# Patient Record
Sex: MALE | Race: WHITE | Employment: FULL TIME | URBAN - METROPOLITAN AREA
[De-identification: names, ages, dates, MRNs, and addresses within clinical notes are randomized per-mention and may not be internally consistent; named-entity substitution may affect disease eponyms.]

---

## 2019-12-10 ENCOUNTER — HOSPITAL ENCOUNTER (OUTPATIENT)
Dept: GENERAL RADIOLOGY | Age: 49
Discharge: HOME | End: 2019-12-12
Payer: COMMERCIAL

## 2019-12-10 ENCOUNTER — HOSPITAL ENCOUNTER (OUTPATIENT)
Age: 49
Discharge: HOME | End: 2019-12-10
Payer: COMMERCIAL

## 2019-12-10 DIAGNOSIS — R52 PAIN: ICD-10-CM

## 2019-12-10 PROCEDURE — 73110 X-RAY EXAM OF WRIST: CPT

## 2019-12-10 PROCEDURE — 73090 X-RAY EXAM OF FOREARM: CPT

## 2021-07-08 ENCOUNTER — APPOINTMENT (OUTPATIENT)
Dept: GENERAL RADIOLOGY | Age: 51
DRG: 303 | End: 2021-07-08
Payer: COMMERCIAL

## 2021-07-08 ENCOUNTER — HOSPITAL ENCOUNTER (INPATIENT)
Age: 51
LOS: 1 days | Discharge: HOME | DRG: 303 | End: 2021-07-09
Attending: EMERGENCY MEDICINE | Admitting: EMERGENCY MEDICINE
Payer: COMMERCIAL

## 2021-07-08 DIAGNOSIS — I50.9 ACUTE ON CHRONIC CONGESTIVE HEART FAILURE, UNSPECIFIED HEART FAILURE TYPE (HCC): Primary | ICD-10-CM

## 2021-07-08 PROBLEM — R07.9 CHEST PAIN: Status: ACTIVE | Noted: 2021-07-08

## 2021-07-08 LAB
ABSOLUTE EOS #: 0.17 K/UL (ref 0–0.44)
ABSOLUTE IMMATURE GRANULOCYTE: 0.13 K/UL (ref 0–0.3)
ABSOLUTE LYMPH #: 2.26 K/UL (ref 1.1–3.7)
ABSOLUTE MONO #: 0.77 K/UL (ref 0.1–1.2)
ANION GAP SERPL CALCULATED.3IONS-SCNC: 16 MMOL/L (ref 9–17)
BASOPHILS # BLD: 1 % (ref 0–2)
BASOPHILS ABSOLUTE: 0.09 K/UL (ref 0–0.2)
BNP INTERPRETATION: NORMAL
BUN BLDV-MCNC: 11 MG/DL (ref 6–20)
BUN/CREAT BLD: ABNORMAL (ref 9–20)
CALCIUM SERPL-MCNC: 9.3 MG/DL (ref 8.6–10.4)
CHLORIDE BLD-SCNC: 92 MMOL/L (ref 98–107)
CO2: 24 MMOL/L (ref 20–31)
CREAT SERPL-MCNC: 1.13 MG/DL (ref 0.7–1.2)
D-DIMER QUANTITATIVE: 0.41 MG/L FEU
DIFFERENTIAL TYPE: ABNORMAL
EOSINOPHILS RELATIVE PERCENT: 2 % (ref 1–4)
GFR AFRICAN AMERICAN: >60 ML/MIN
GFR NON-AFRICAN AMERICAN: >60 ML/MIN
GFR SERPL CREATININE-BSD FRML MDRD: ABNORMAL ML/MIN/{1.73_M2}
GFR SERPL CREATININE-BSD FRML MDRD: ABNORMAL ML/MIN/{1.73_M2}
GLUCOSE BLD-MCNC: 370 MG/DL (ref 70–99)
HCT VFR BLD CALC: 46.5 % (ref 40.7–50.3)
HEMOGLOBIN: 14.9 G/DL (ref 13–17)
IMMATURE GRANULOCYTES: 1 %
LIPASE: 47 U/L (ref 13–60)
LYMPHOCYTES # BLD: 25 % (ref 24–43)
MCH RBC QN AUTO: 30.6 PG (ref 25.2–33.5)
MCHC RBC AUTO-ENTMCNC: 32 G/DL (ref 28.4–34.8)
MCV RBC AUTO: 95.5 FL (ref 82.6–102.9)
MONOCYTES # BLD: 9 % (ref 3–12)
NRBC AUTOMATED: 0 PER 100 WBC
PDW BLD-RTO: 14.1 % (ref 11.8–14.4)
PLATELET # BLD: 251 K/UL (ref 138–453)
PLATELET ESTIMATE: ABNORMAL
PMV BLD AUTO: 11 FL (ref 8.1–13.5)
POTASSIUM SERPL-SCNC: 4 MMOL/L (ref 3.7–5.3)
PRO-BNP: <20 PG/ML
RBC # BLD: 4.87 M/UL (ref 4.21–5.77)
RBC # BLD: ABNORMAL 10*6/UL
SEG NEUTROPHILS: 62 % (ref 36–65)
SEGMENTED NEUTROPHILS ABSOLUTE COUNT: 5.61 K/UL (ref 1.5–8.1)
SODIUM BLD-SCNC: 132 MMOL/L (ref 135–144)
TROPONIN INTERP: NORMAL
TROPONIN INTERP: NORMAL
TROPONIN T: NORMAL NG/ML
TROPONIN T: NORMAL NG/ML
TROPONIN, HIGH SENSITIVITY: 17 NG/L (ref 0–22)
TROPONIN, HIGH SENSITIVITY: 18 NG/L (ref 0–22)
WBC # BLD: 9 K/UL (ref 3.5–11.3)
WBC # BLD: ABNORMAL 10*3/UL

## 2021-07-08 PROCEDURE — G0378 HOSPITAL OBSERVATION PER HR: HCPCS

## 2021-07-08 PROCEDURE — 93005 ELECTROCARDIOGRAM TRACING: CPT | Performed by: STUDENT IN AN ORGANIZED HEALTH CARE EDUCATION/TRAINING PROGRAM

## 2021-07-08 PROCEDURE — 80048 BASIC METABOLIC PNL TOTAL CA: CPT

## 2021-07-08 PROCEDURE — 2580000003 HC RX 258: Performed by: STUDENT IN AN ORGANIZED HEALTH CARE EDUCATION/TRAINING PROGRAM

## 2021-07-08 PROCEDURE — 71046 X-RAY EXAM CHEST 2 VIEWS: CPT

## 2021-07-08 PROCEDURE — 84484 ASSAY OF TROPONIN QUANT: CPT

## 2021-07-08 PROCEDURE — 85025 COMPLETE CBC W/AUTO DIFF WBC: CPT

## 2021-07-08 PROCEDURE — 83690 ASSAY OF LIPASE: CPT

## 2021-07-08 PROCEDURE — 1200000000 HC SEMI PRIVATE

## 2021-07-08 PROCEDURE — 85379 FIBRIN DEGRADATION QUANT: CPT

## 2021-07-08 PROCEDURE — 83880 ASSAY OF NATRIURETIC PEPTIDE: CPT

## 2021-07-08 PROCEDURE — 99283 EMERGENCY DEPT VISIT LOW MDM: CPT

## 2021-07-08 RX ORDER — 0.9 % SODIUM CHLORIDE 0.9 %
1000 INTRAVENOUS SOLUTION INTRAVENOUS ONCE
Status: COMPLETED | OUTPATIENT
Start: 2021-07-08 | End: 2021-07-08

## 2021-07-08 RX ORDER — PRASUGREL 10 MG/1
10 TABLET, FILM COATED ORAL DAILY
Status: DISCONTINUED | OUTPATIENT
Start: 2021-07-09 | End: 2021-07-09 | Stop reason: HOSPADM

## 2021-07-08 RX ORDER — DULOXETIN HYDROCHLORIDE 30 MG/1
60 CAPSULE, DELAYED RELEASE ORAL DAILY
Status: DISCONTINUED | OUTPATIENT
Start: 2021-07-09 | End: 2021-07-09 | Stop reason: HOSPADM

## 2021-07-08 RX ORDER — AMLODIPINE BESYLATE 2.5 MG/1
2.5 TABLET ORAL NIGHTLY
Status: DISCONTINUED | OUTPATIENT
Start: 2021-07-08 | End: 2021-07-09 | Stop reason: HOSPADM

## 2021-07-08 RX ORDER — CETIRIZINE HYDROCHLORIDE 10 MG/1
10 TABLET ORAL DAILY
Status: DISCONTINUED | OUTPATIENT
Start: 2021-07-08 | End: 2021-07-09 | Stop reason: HOSPADM

## 2021-07-08 RX ORDER — CHOLECALCIFEROL (VITAMIN D3) 125 MCG
1000 CAPSULE ORAL DAILY
Status: DISCONTINUED | OUTPATIENT
Start: 2021-07-09 | End: 2021-07-09 | Stop reason: HOSPADM

## 2021-07-08 RX ORDER — SULFAMETHOXAZOLE AND TRIMETHOPRIM 800; 160 MG/1; MG/1
1 TABLET ORAL 2 TIMES DAILY
Status: DISCONTINUED | OUTPATIENT
Start: 2021-07-08 | End: 2021-07-09 | Stop reason: HOSPADM

## 2021-07-08 RX ORDER — ASPIRIN 81 MG/1
81 TABLET ORAL DAILY
Status: DISCONTINUED | OUTPATIENT
Start: 2021-07-09 | End: 2021-07-09 | Stop reason: HOSPADM

## 2021-07-08 RX ORDER — ONDANSETRON 2 MG/ML
4 INJECTION INTRAMUSCULAR; INTRAVENOUS EVERY 6 HOURS PRN
Status: DISCONTINUED | OUTPATIENT
Start: 2021-07-08 | End: 2021-07-09 | Stop reason: HOSPADM

## 2021-07-08 RX ORDER — FAMOTIDINE 20 MG/1
20 TABLET, FILM COATED ORAL 2 TIMES DAILY
COMMUNITY

## 2021-07-08 RX ORDER — EMPAGLIFLOZIN 25 MG/1
25 TABLET, FILM COATED ORAL DAILY
COMMUNITY

## 2021-07-08 RX ORDER — SODIUM CHLORIDE 9 MG/ML
25 INJECTION, SOLUTION INTRAVENOUS PRN
Status: DISCONTINUED | OUTPATIENT
Start: 2021-07-08 | End: 2021-07-09 | Stop reason: HOSPADM

## 2021-07-08 RX ORDER — ASPIRIN 81 MG/1
81 TABLET ORAL DAILY
COMMUNITY

## 2021-07-08 RX ORDER — ACITRETIN 25 MG/1
25 CAPSULE ORAL 2 TIMES DAILY
COMMUNITY

## 2021-07-08 RX ORDER — TRAZODONE HYDROCHLORIDE 100 MG/1
100 TABLET ORAL NIGHTLY
Status: DISCONTINUED | OUTPATIENT
Start: 2021-07-08 | End: 2021-07-08

## 2021-07-08 RX ORDER — POTASSIUM CHLORIDE 750 MG/1
10 TABLET, FILM COATED, EXTENDED RELEASE ORAL NIGHTLY
COMMUNITY

## 2021-07-08 RX ORDER — SODIUM CHLORIDE, SODIUM LACTATE, POTASSIUM CHLORIDE, CALCIUM CHLORIDE 600; 310; 30; 20 MG/100ML; MG/100ML; MG/100ML; MG/100ML
1000 INJECTION, SOLUTION INTRAVENOUS ONCE
Status: COMPLETED | OUTPATIENT
Start: 2021-07-08 | End: 2021-07-08

## 2021-07-08 RX ORDER — DAPSONE 25 MG/1
25 TABLET ORAL 2 TIMES DAILY
COMMUNITY

## 2021-07-08 RX ORDER — RANOLAZINE 500 MG/1
500 TABLET, EXTENDED RELEASE ORAL 2 TIMES DAILY
Status: DISCONTINUED | OUTPATIENT
Start: 2021-07-08 | End: 2021-07-09

## 2021-07-08 RX ORDER — BUMETANIDE 2 MG/1
2 TABLET ORAL 2 TIMES DAILY
COMMUNITY

## 2021-07-08 RX ORDER — VENLAFAXINE HYDROCHLORIDE 75 MG/1
75 CAPSULE, EXTENDED RELEASE ORAL DAILY
Status: DISCONTINUED | OUTPATIENT
Start: 2021-07-08 | End: 2021-07-08

## 2021-07-08 RX ORDER — GABAPENTIN 600 MG/1
600 TABLET ORAL NIGHTLY
Status: DISCONTINUED | OUTPATIENT
Start: 2021-07-08 | End: 2021-07-09 | Stop reason: HOSPADM

## 2021-07-08 RX ORDER — LISINOPRIL 10 MG/1
5 TABLET ORAL DAILY
Status: DISCONTINUED | OUTPATIENT
Start: 2021-07-08 | End: 2021-07-09 | Stop reason: HOSPADM

## 2021-07-08 RX ORDER — DULOXETIN HYDROCHLORIDE 60 MG/1
60 CAPSULE, DELAYED RELEASE ORAL DAILY
COMMUNITY

## 2021-07-08 RX ORDER — DAPSONE 25 MG/1
25 TABLET ORAL 2 TIMES DAILY
Status: DISCONTINUED | OUTPATIENT
Start: 2021-07-08 | End: 2021-07-09 | Stop reason: HOSPADM

## 2021-07-08 RX ORDER — MELOXICAM 7.5 MG/1
15 TABLET ORAL DAILY
Status: DISCONTINUED | OUTPATIENT
Start: 2021-07-08 | End: 2021-07-09

## 2021-07-08 RX ORDER — ONDANSETRON 4 MG/1
4 TABLET, ORALLY DISINTEGRATING ORAL EVERY 8 HOURS PRN
Status: DISCONTINUED | OUTPATIENT
Start: 2021-07-08 | End: 2021-07-09 | Stop reason: HOSPADM

## 2021-07-08 RX ORDER — ATORVASTATIN CALCIUM 20 MG/1
20 TABLET, FILM COATED ORAL DAILY
Status: DISCONTINUED | OUTPATIENT
Start: 2021-07-08 | End: 2021-07-08

## 2021-07-08 RX ORDER — AMLODIPINE BESYLATE 2.5 MG/1
2.5 TABLET ORAL NIGHTLY
COMMUNITY

## 2021-07-08 RX ORDER — CITALOPRAM 20 MG/1
40 TABLET ORAL DAILY
Status: DISCONTINUED | OUTPATIENT
Start: 2021-07-08 | End: 2021-07-08

## 2021-07-08 RX ORDER — PANTOPRAZOLE SODIUM 40 MG/1
40 TABLET, DELAYED RELEASE ORAL DAILY
Status: DISCONTINUED | OUTPATIENT
Start: 2021-07-08 | End: 2021-07-09 | Stop reason: HOSPADM

## 2021-07-08 RX ORDER — GABAPENTIN 300 MG/1
600 CAPSULE ORAL NIGHTLY
COMMUNITY

## 2021-07-08 RX ORDER — LANOLIN ALCOHOL/MO/W.PET/CERES
1000 CREAM (GRAM) TOPICAL DAILY
COMMUNITY

## 2021-07-08 RX ORDER — SULFAMETHOXAZOLE AND TRIMETHOPRIM 800; 160 MG/1; MG/1
1 TABLET ORAL 2 TIMES DAILY
COMMUNITY

## 2021-07-08 RX ORDER — POTASSIUM CHLORIDE 750 MG/1
10 CAPSULE, EXTENDED RELEASE ORAL NIGHTLY
Status: DISCONTINUED | OUTPATIENT
Start: 2021-07-08 | End: 2021-07-09 | Stop reason: HOSPADM

## 2021-07-08 RX ORDER — SODIUM CHLORIDE 0.9 % (FLUSH) 0.9 %
5-40 SYRINGE (ML) INJECTION PRN
Status: DISCONTINUED | OUTPATIENT
Start: 2021-07-08 | End: 2021-07-09 | Stop reason: HOSPADM

## 2021-07-08 RX ORDER — LISINOPRIL 20 MG/1
20 TABLET ORAL DAILY
COMMUNITY

## 2021-07-08 RX ORDER — GLIPIZIDE 5 MG/1
2.5 TABLET ORAL
Status: DISCONTINUED | OUTPATIENT
Start: 2021-07-09 | End: 2021-07-09 | Stop reason: HOSPADM

## 2021-07-08 RX ORDER — ACETAMINOPHEN 325 MG/1
650 TABLET ORAL EVERY 4 HOURS PRN
Status: DISCONTINUED | OUTPATIENT
Start: 2021-07-08 | End: 2021-07-09 | Stop reason: HOSPADM

## 2021-07-08 RX ORDER — ACITRETIN 25 MG/1
25 CAPSULE ORAL 2 TIMES DAILY
Status: DISCONTINUED | OUTPATIENT
Start: 2021-07-08 | End: 2021-07-09 | Stop reason: HOSPADM

## 2021-07-08 RX ORDER — SODIUM CHLORIDE 0.9 % (FLUSH) 0.9 %
5-40 SYRINGE (ML) INJECTION EVERY 12 HOURS SCHEDULED
Status: DISCONTINUED | OUTPATIENT
Start: 2021-07-08 | End: 2021-07-09 | Stop reason: HOSPADM

## 2021-07-08 RX ORDER — CARVEDILOL 12.5 MG/1
12.5 TABLET ORAL 2 TIMES DAILY
Status: DISCONTINUED | OUTPATIENT
Start: 2021-07-08 | End: 2021-07-09 | Stop reason: HOSPADM

## 2021-07-08 RX ORDER — BUMETANIDE 1 MG/1
2 TABLET ORAL DAILY
Status: DISCONTINUED | OUTPATIENT
Start: 2021-07-08 | End: 2021-07-09 | Stop reason: HOSPADM

## 2021-07-08 RX ORDER — RANOLAZINE 500 MG/1
500 TABLET, EXTENDED RELEASE ORAL 2 TIMES DAILY
Status: ON HOLD | COMMUNITY
End: 2021-07-09 | Stop reason: HOSPADM

## 2021-07-08 RX ORDER — PRASUGREL 10 MG/1
10 TABLET, FILM COATED ORAL DAILY
COMMUNITY

## 2021-07-08 RX ORDER — CARVEDILOL 12.5 MG/1
12.5 TABLET ORAL 2 TIMES DAILY WITH MEALS
COMMUNITY

## 2021-07-08 RX ORDER — CHOLECALCIFEROL (VITAMIN D3) 1250 MCG
CAPSULE ORAL
COMMUNITY

## 2021-07-08 RX ADMIN — SODIUM CHLORIDE, POTASSIUM CHLORIDE, SODIUM LACTATE AND CALCIUM CHLORIDE 1000 ML: 600; 310; 30; 20 INJECTION, SOLUTION INTRAVENOUS at 21:59

## 2021-07-08 RX ADMIN — SODIUM CHLORIDE 1000 ML: 9 INJECTION, SOLUTION INTRAVENOUS at 18:13

## 2021-07-08 ASSESSMENT — ENCOUNTER SYMPTOMS
TROUBLE SWALLOWING: 0
ABDOMINAL DISTENTION: 0
CHEST TIGHTNESS: 1
WHEEZING: 0
COUGH: 0
APNEA: 0
VOMITING: 0
CHOKING: 0
BACK PAIN: 0
ABDOMINAL PAIN: 0
NAUSEA: 0
STRIDOR: 0
SHORTNESS OF BREATH: 1
BLOOD IN STOOL: 0

## 2021-07-08 ASSESSMENT — PAIN SCALES - GENERAL: PAINLEVEL_OUTOF10: 3

## 2021-07-08 ASSESSMENT — HEART SCORE: ECG: 1

## 2021-07-08 NOTE — LETTER
Taniya Case 3[facility] Med Surg  [address] [facility] [address]  Phone: [phone]         July 9, 2021     Patient: Alba Del Valle   YOB: 1970   Date of Visit: 7/8/2021       To Whom It May Concern:    Sebastian Guzman was seen and treated in our emergency department on 7/8/2021. The following work duties are recommended.     He may return to work on Monday 7/12        *If the company does not have an occupational health provider, follow up should be at  [address] [phone]    In 1 week            Sincerely,        Liliam Gonzalez MD        Signature:__________________________________

## 2021-07-08 NOTE — ED TRIAGE NOTES
Pt reports to the ED via EMS with c/o chest pain. Pt states chest pain while at work and took x2 of his own nitro and called ems, they gave x2 nitro, 325 of Asprin and 25mcg of fentanyl. Pt has hx of x4 stents. Pt is A&Ox4, RR even and non labored.

## 2021-07-08 NOTE — ED PROVIDER NOTES
[address] [facility]  Emergency Department Encounter  EmergencyMedicine Resident     Pt Name:Arben Martin  MRN: [id number]  [place] 1970  Date of evaluation: 7/8/21  PCP:  Adriana Taylor    This patient was evaluated in the Emergency Department for symptoms described in the history of present illness. The patient was evaluated in the context of the global COVID-19 pandemic, which necessitated consideration that the patient might be at risk for infection with the SARS-CoV-2 virus that causes COVID-19. Institutional protocols and algorithms that pertain to the evaluation of patients at risk for COVID-19 are in a state of rapid change based on information released by regulatory bodies including the CDC and federal and state organizations. These policies and algorithms were followed during the patient's care in the ED. CHIEF COMPLAINT       Chief Complaint   Patient presents with    Chest Pain     Pt states chest pain while at work. pain rated 6/10       HISTORY OF PRESENT ILLNESS  (Location/Symptom, Timing/Onset, Context/Setting, Quality, Duration, Modifying Factors, Severity.)      Judith Morrison is a 48 y.o. male with a history of CHF multiple stents and diabetes presenting today with chest pain that started around noon rated currently a 3 out of 10 radiating to his axilla. Chest pain is associated with dyspnea, patient was walking around his cubicle when he felt a sudden onset. He was brought in by EMS, given 2 doses of nitroglycerin along with an aspirin. Patient does not have any dizziness drowsiness he did not lose consciousness there was no evidence of a syncopal episode. He did not feel palpitations, he does not have abdominal pain, and is otherwise doing well.       PAST MEDICAL / SURGICAL / SOCIAL / FAMILY HISTORY      has a past medical history of Chronic pain, Depression, Diabetes mellitus ([address] Edema, GERD (gastroesophageal reflux disease), Hyperlipidemia, Hypertension, Kidney calculi, and Vertigo. has a past surgical history that includes Tonsillectomy; Knee arthroscopy (Right); Spinal fusion; Knee arthroscopy (Left); Nerve Surgery; and tumor removal.      Social History     Socioeconomic History    Marital status:      Spouse name: Not on file    Number of children: Not on file    Years of education: Not on file    Highest education level: Not on file   Occupational History    Not on file   Tobacco Use    Smoking status: Never Smoker   Substance and Sexual Activity    Alcohol use: Yes     Comment: Socially    Drug use: No    Sexual activity: Not on file   Other Topics Concern    Not on file   Social History Narrative    Not on file     Social Determinants of Health     Financial Resource Strain:     Difficulty of Paying Living Expenses:    Food Insecurity:     Worried About Running Out of Food in the Last Year:     [address] in the Last Year:    Transportation Needs:     Lack of Transportation (Medical):  Lack of Transportation (Non-Medical):    Physical Activity:     Days of Exercise per Week:     Minutes of Exercise per Session:    Stress:     Feeling of Stress :    Social Connections:     Frequency of Communication with Friends and Family:     Frequency of Social Gatherings with Friends and Family:     Attends [employer] Services:     Active Member of Clubs or Organizations:     Attends Club or Organization Meetings:     Marital Status:    Intimate Partner Violence:     Fear of Current or Ex-Partner:     Emotionally Abused:     Physically Abused:     Sexually Abused:        History reviewed. No pertinent family history. Allergies:  Ceclor [cefaclor] and Pcn [penicillins]    Home Medications:  Prior to Admission medications    Medication Sig Start Date End Date Taking?  Authorizing Provider   acitretin (SORIATANE) 25 MG capsule Take 25 mg by mouth 2 times daily   Yes Historical Provider, MD   sulfamethoxazole-trimethoprim (BACTRIM DS) 800-160 MG per tablet Take 1 tablet by mouth 2 times daily   Yes Historical Provider, MD   bumetanide (BUMEX) 2 MG tablet Take 2 mg by mouth 2 times daily   Yes Historical Provider, MD   carvedilol (COREG) 12.5 MG tablet Take 12.5 mg by mouth 2 times daily (with meals)   Yes Historical Provider, MD   DULoxetine (CYMBALTA) 60 MG extended release capsule Take 60 mg by mouth daily   Yes Historical Provider, MD   dapsone 25 MG tablet Take 25 mg by mouth 2 times daily   Yes Historical Provider, MD   empagliflozin (JARDIANCE) 25 MG tablet Take 25 mg by mouth daily   Yes Historical Provider, MD   gabapentin (NEURONTIN) 300 MG capsule Take 600 mg by mouth nightly. Yes Historical Provider, MD   lisinopril (PRINIVIL;ZESTRIL) 20 MG tablet Take 20 mg by mouth daily   Yes Historical Provider, MD   metFORMIN (GLUCOPHAGE) 1000 MG tablet Take 1,000 mg by mouth 2 times daily (with meals)   Yes Historical Provider, MD   famotidine (PEPCID) 20 MG tablet Take 20 mg by mouth 2 times daily   Yes Historical Provider, MD   prasugrel (EFFIENT) 10 MG TABS Take 10 mg by mouth daily   Yes Historical Provider, MD   ranolazine (RANEXA) 500 MG extended release tablet Take 500 mg by mouth 2 times daily   Yes Historical Provider, MD   potassium chloride (KLOR-CON) 10 MEQ extended release tablet Take 10 mEq by mouth nightly   Yes Historical Provider, MD   Cholecalciferol (VITAMIN D3) 1.25 MG (23816 UT) CAPS Take by mouth 2x week   Yes Historical Provider, MD   vitamin B-12 (CYANOCOBALAMIN) 1000 MCG tablet Take 1,000 mcg by mouth daily   Yes Historical Provider, MD   amLODIPine (NORVASC) 2.5 MG tablet Take 2.5 mg by mouth nightly   Yes Historical Provider, MD   aspirin 81 MG EC tablet Take 81 mg by mouth daily   Yes Historical Provider, MD   atorvastatin (LIPITOR) 20 MG tablet Take 80 mg by mouth nightly    Yes Historical Provider, MD   cetirizine (ZYRTEC) 10 MG tablet Take 10 mg by mouth daily.    Yes Historical Provider, MD   glimepiride (AMARYL) 4 MG tablet Take 4 mg by mouth 2 times daily    Yes Historical Provider, MD   HYDROcodone-acetaminophen (NORCO) 5-325 MG per tablet Take 1-2 tablets by mouth every 6 hours as needed for Pain. 5/28/14   Caitie Palacios MD       REVIEW OF SYSTEMS    (2-9 systems for level 4, 10 or more for level 5)      Review of Systems   Constitutional: Negative for activity change, appetite change, chills, diaphoresis, fatigue, fever and unexpected weight change. HENT: Negative for congestion, dental problem and ear pain. Eyes: Negative for visual disturbance. Respiratory: Positive for chest tightness and shortness of breath. Negative for apnea, cough, choking, wheezing and stridor. Cardiovascular: Positive for chest pain. Negative for palpitations and leg swelling. Gastrointestinal: Negative for abdominal distention, abdominal pain, blood in stool, nausea and vomiting. Genitourinary: Negative for difficulty urinating, dysuria and flank pain. Musculoskeletal: Negative for arthralgias and back pain. Neurological: Negative for dizziness, syncope, weakness, light-headedness and headaches. Psychiatric/Behavioral: Negative for agitation. PHYSICAL EXAM   (up to 7 for level 4, 8 or more for level 5)      INITIAL VITALS:   /80   Pulse 78   Temp 97.3 °F (36.3 °C) (Oral)   Resp 20   SpO2 97%     Physical Exam  Constitutional:       General: He is not in acute distress. Appearance: Normal appearance. He is obese. He is not ill-appearing or toxic-appearing. HENT:      Head: Normocephalic and atraumatic. Mouth/Throat:      Mouth: Mucous membranes are moist.   Eyes:      Extraocular Movements: Extraocular movements intact. Pupils: Pupils are equal, round, and reactive to light. Cardiovascular:      Rate and Rhythm: Regular rhythm. Tachycardia present. Pulses: Normal pulses. Heart sounds: Normal heart sounds. No friction rub. No gallop.     Pulmonary:      Effort: Pulmonary effort is normal. No respiratory distress. Breath sounds: No stridor. Rhonchi present. No wheezing or rales. Chest:      Chest wall: Tenderness present. Abdominal:      General: There is no distension. Palpations: There is no mass. Tenderness: There is no abdominal tenderness. There is no guarding or rebound. Hernia: No hernia is present. Neurological:      General: No focal deficit present. Mental Status: He is alert. Mental status is at baseline. DIFFERENTIAL  DIAGNOSIS     PLAN (LABS / IMAGING / EKG):  Orders Placed This Encounter   Procedures    XR CHEST (2 VW)    CBC Auto Differential    Basic Metabolic Panel w/ Reflex to MG    Lipase    Troponin    Brain Natriuretic Peptide    D-Dimer, Quantitative    Troponin    ADULT DIET; Regular;  Low Sodium (2 gm)    Vital signs per unit routine    Notify physician    Notify physician    Up as tolerated    Place intermittent pneumatic compression device    HYPOGLYCEMIA TREATMENT: blood glucose less than 50 mg/dL and patient  ALERT and TOLERATING PO    HYPOGLYCEMIA TREATMENT: blood glucose less than 70 mg/dL and patient ALERT and TOLERATING PO    HYPOGLYCEMIA TREATMENT: blood glucose less than 70 mg/dL and patient NOT ALERT or NPO    Full Code    Inpatient consult to Cardiology    Initiate Oxygen Therapy Protocol    POC Glucose Fingerstick    POCT Glucose    EKG 12 Lead    EKG 12 Lead    PATIENT STATUS (FROM ED OR OR/PROCEDURAL) Observation       MEDICATIONS ORDERED:  Orders Placed This Encounter   Medications    0.9 % sodium chloride bolus    lactated ringers infusion 1,000 mL    lisinopril (PRINIVIL;ZESTRIL) tablet 5 mg    DISCONTD: atorvastatin (LIPITOR) tablet 20 mg    cetirizine (ZYRTEC) tablet 10 mg    DISCONTD: citalopram (CELEXA) tablet 40 mg    DISCONTD: venlafaxine (EFFEXOR XR) extended release capsule 75 mg    meloxicam (MOBIC) tablet 15 mg    DISCONTD: metFORMIN (GLUCOPHAGE) tablet 850 mg    DISCONTD: MCV 95.5 82.6 - 102.9 fL    MCH 30.6 25.2 - 33.5 pg    MCHC 32.0 28.4 - 34.8 g/dL    RDW 14.1 11.8 - 14.4 %    Platelets 849 961 - 003 k/uL    MPV 11.0 8.1 - 13.5 fL    NRBC Automated 0.0 0.0 per 100 WBC    Differential Type NOT REPORTED     Seg Neutrophils 62 36 - 65 %    Lymphocytes 25 24 - 43 %    Monocytes 9 3 - 12 %    Eosinophils % 2 1 - 4 %    Basophils 1 0 - 2 %    Immature Granulocytes 1 (H) 0 %    Segs Absolute 5.61 1.50 - 8.10 k/uL    Absolute Lymph # 2.26 1.10 - 3.70 k/uL    Absolute Mono # 0.77 0.10 - 1.20 k/uL    Absolute Eos # 0.17 0.00 - 0.44 k/uL    Basophils Absolute 0.09 0.00 - 0.20 k/uL    Absolute Immature Granulocyte 0.13 0.00 - 0.30 k/uL    WBC Morphology NOT REPORTED     RBC Morphology NOT REPORTED     Platelet Estimate NOT REPORTED    Basic Metabolic Panel w/ Reflex to MG   Result Value Ref Range    Glucose 370 (H) 70 - 99 mg/dL    BUN 11 6 - 20 mg/dL    CREATININE 1.13 0.70 - 1.20 mg/dL    Bun/Cre Ratio NOT REPORTED 9 - 20    Calcium 9.3 8.6 - 10.4 mg/dL    Sodium 132 (L) 135 - 144 mmol/L    Potassium 4.0 3.7 - 5.3 mmol/L    Chloride 92 (L) 98 - 107 mmol/L    CO2 24 20 - 31 mmol/L    Anion Gap 16 9 - 17 mmol/L    GFR Non-African American >60 >60 mL/min    GFR African American >60 >60 mL/min    GFR Comment          GFR Staging NOT REPORTED    Lipase   Result Value Ref Range    Lipase 47 13 - 60 U/L   Troponin   Result Value Ref Range    Troponin, High Sensitivity 18 0 - 22 ng/L    Troponin T NOT REPORTED <0.03 ng/mL    Troponin Interp NOT REPORTED    Brain Natriuretic Peptide   Result Value Ref Range    Pro-BNP <20 <300 pg/mL    BNP Interpretation Pro-BNP Reference Range:    D-Dimer, Quantitative   Result Value Ref Range    D-Dimer, Quant 0.41 mg/L FEU   Troponin   Result Value Ref Range    Troponin, High Sensitivity 17 0 - 22 ng/L    Troponin T NOT REPORTED <0.03 ng/mL    Troponin Interp NOT REPORTED    POC Glucose Fingerstick   Result Value Ref Range    POC Glucose 306 (H) 75 - 110 mg/dL RADIOLOGY:  XR CHEST (2 VW)    Result Date: 7/8/2021  No acute cardiopulmonary findings       EKG  EKG Interpretation    Interpreted by emergency department physician    Rhythm: normal sinus   Rate: tachycardia  Axis: left  Ectopy: none  Conduction: normal  ST Segments: nonspecific changes  T Waves: flattening in  nonspecific  Q Waves: none    Clinical Impression: non-specific EKG, cannot rule out anterior infarction. Molly Kimball DO      All EKG's are interpreted by the Emergency Department Physician who either signs or Co-signs this chart in the absence of a cardiologist.    EMERGENCY DEPARTMENT COURSE:  ED Course as of Jul 09 0243   Thu Jul 08, 2021   1752 First troponin drawn at 1630 was 18, repeat troponin to be collected at 1800. [WF]   7903 Chest x-ray reviewed, showed no cardiopulmonary abnormalities. Labs reviewed, patient has mild hyperglycemia, however no other acute abnormalities. BNP normal.    [KK]      ED Course User Index  [KK] Mariposa Giang DO         A/P:  79-year-old male with chest pain and shortness of breath without acute ischemia, possible CHF exacerbation however patient not overloaded clinically and is otherwise doing well. Work-up was negative for cardiovascular abnormalities, patient has a heart score of 4 with a negative troponin and nonspecific EKG, patient will be admitted to observation with cardiology evaluation in the morning. Cardiology has been consulted, they agreed to see the patient. CONSULTS:  IP CONSULT TO CARDIOLOGY        FINAL IMPRESSION      1. Acute on chronic congestive heart failure, unspecified heart failure type ([facility] [address]          DISPOSITION / PLAN     DISPOSITION Admitted 07/08/2021 07:34:37 PM      PATIENT REFERRED TO:  No follow-up provider specified.     DISCHARGE MEDICATIONS:  Current Discharge Medication List          Molly Kimball DO  Emergency Medicine Resident    (Please note that portions of thisnote were completed with a voice

## 2021-07-08 NOTE — ED PROVIDER NOTES
[facility]  Emergency Department  Faculty Attestation     I performed a history and physical examination of the patient and discussed management with the resident. I reviewed the residents note and agree with the documented findings and plan of care. Any areas of disagreement are noted on the chart. I was personally present for the key portions of any procedures. I have documented in the chart those procedures where I was not present during the key portions. I have reviewed the emergency nurses triage note. I agree with the chief complaint, past medical history, past surgical history, allergies, medications, social and family history as documented unless otherwise noted below. For Physician Assistant/ Nurse Practitioner cases/documentation I have personally evaluated this patient and have completed at least one if not all key elements of the E/M (history, physical exam, and MDM). Additional findings are as noted. Primary Care Physician:  Angela Ware    Screenings:  [unfilled]    CHIEF COMPLAINT       Chief Complaint   Patient presents with    Chest Pain     Pt states chest pain while at work.  pain rated 6/10       RECENT VITALS:    ,  Pulse: 120, Resp: 18, BP: (!) 148/93    LABS:  Labs Reviewed   CBC WITH AUTO DIFFERENTIAL   BASIC METABOLIC PANEL W/ REFLEX TO MG FOR LOW K   LIPASE   TROPONIN   BRAIN NATRIURETIC PEPTIDE   D-DIMER, QUANTITATIVE       Radiology  XR CHEST (2 VW)    (Results Pending)           EKG:   EKG Interpretation    Interpreted by me    Rhythm: normal sinus   Rate: normal  Axis: Left  Ectopy: none  Conduction: normal  ST Segments: no acute change  T Waves: Diffuse flattening  Q Waves: Septal  Poor R wave progression anteriorly    Clinical Impression: Probable old anterior MI, nonspecific T wave change    Attending Physician Additional  Notes    Patient been having intermittent well localized left parasternal border chest discomfort that

## 2021-07-08 NOTE — ED NOTES
Bed: 24  Expected date:   Expected time:   Means of arrival:   Comments:  [address], RN  07/08/21 1603

## 2021-07-09 ENCOUNTER — APPOINTMENT (OUTPATIENT)
Dept: NUCLEAR MEDICINE | Age: 51
DRG: 303 | End: 2021-07-09
Payer: COMMERCIAL

## 2021-07-09 VITALS
DIASTOLIC BLOOD PRESSURE: 82 MMHG | OXYGEN SATURATION: 99 % | SYSTOLIC BLOOD PRESSURE: 137 MMHG | RESPIRATION RATE: 18 BRPM | HEART RATE: 76 BPM | TEMPERATURE: 97.3 F

## 2021-07-09 LAB
EKG ATRIAL RATE: 110 BPM
EKG ATRIAL RATE: 74 BPM
EKG P AXIS: -12 DEGREES
EKG P AXIS: 44 DEGREES
EKG P-R INTERVAL: 146 MS
EKG P-R INTERVAL: 174 MS
EKG Q-T INTERVAL: 336 MS
EKG Q-T INTERVAL: 418 MS
EKG QRS DURATION: 74 MS
EKG QRS DURATION: 92 MS
EKG QTC CALCULATION (BAZETT): 454 MS
EKG QTC CALCULATION (BAZETT): 463 MS
EKG R AXIS: -19 DEGREES
EKG R AXIS: -27 DEGREES
EKG T AXIS: 23 DEGREES
EKG T AXIS: 4 DEGREES
EKG VENTRICULAR RATE: 110 BPM
EKG VENTRICULAR RATE: 74 BPM
GLUCOSE BLD-MCNC: 227 MG/DL (ref 75–110)
GLUCOSE BLD-MCNC: 304 MG/DL (ref 75–110)
GLUCOSE BLD-MCNC: 306 MG/DL (ref 75–110)
LV EF: 65 %
LVEF MODALITY: NORMAL

## 2021-07-09 PROCEDURE — G0378 HOSPITAL OBSERVATION PER HR: HCPCS

## 2021-07-09 PROCEDURE — A9500 TC99M SESTAMIBI: HCPCS | Performed by: STUDENT IN AN ORGANIZED HEALTH CARE EDUCATION/TRAINING PROGRAM

## 2021-07-09 PROCEDURE — 6370000000 HC RX 637 (ALT 250 FOR IP): Performed by: STUDENT IN AN ORGANIZED HEALTH CARE EDUCATION/TRAINING PROGRAM

## 2021-07-09 PROCEDURE — 82947 ASSAY GLUCOSE BLOOD QUANT: CPT

## 2021-07-09 PROCEDURE — 2580000003 HC RX 258: Performed by: STUDENT IN AN ORGANIZED HEALTH CARE EDUCATION/TRAINING PROGRAM

## 2021-07-09 PROCEDURE — 6360000002 HC RX W HCPCS: Performed by: STUDENT IN AN ORGANIZED HEALTH CARE EDUCATION/TRAINING PROGRAM

## 2021-07-09 PROCEDURE — 78452 HT MUSCLE IMAGE SPECT MULT: CPT

## 2021-07-09 PROCEDURE — 6370000000 HC RX 637 (ALT 250 FOR IP): Performed by: NURSE PRACTITIONER

## 2021-07-09 PROCEDURE — 93017 CV STRESS TEST TRACING ONLY: CPT

## 2021-07-09 PROCEDURE — 93005 ELECTROCARDIOGRAM TRACING: CPT | Performed by: STUDENT IN AN ORGANIZED HEALTH CARE EDUCATION/TRAINING PROGRAM

## 2021-07-09 PROCEDURE — 3430000000 HC RX DIAGNOSTIC RADIOPHARMACEUTICAL: Performed by: STUDENT IN AN ORGANIZED HEALTH CARE EDUCATION/TRAINING PROGRAM

## 2021-07-09 RX ORDER — NICOTINE POLACRILEX 4 MG
15 LOZENGE BUCCAL PRN
Status: DISCONTINUED | OUTPATIENT
Start: 2021-07-09 | End: 2021-07-09 | Stop reason: HOSPADM

## 2021-07-09 RX ORDER — AMINOPHYLLINE DIHYDRATE 25 MG/ML
50 INJECTION, SOLUTION INTRAVENOUS PRN
Status: DISCONTINUED | OUTPATIENT
Start: 2021-07-09 | End: 2021-07-09 | Stop reason: ALTCHOICE

## 2021-07-09 RX ORDER — ALBUTEROL SULFATE 90 UG/1
2 AEROSOL, METERED RESPIRATORY (INHALATION) PRN
Status: DISCONTINUED | OUTPATIENT
Start: 2021-07-09 | End: 2021-07-09 | Stop reason: ALTCHOICE

## 2021-07-09 RX ORDER — METOPROLOL TARTRATE 5 MG/5ML
5 INJECTION INTRAVENOUS EVERY 5 MIN PRN
Status: DISCONTINUED | OUTPATIENT
Start: 2021-07-09 | End: 2021-07-09 | Stop reason: ALTCHOICE

## 2021-07-09 RX ORDER — SODIUM CHLORIDE 0.9 % (FLUSH) 0.9 %
5-40 SYRINGE (ML) INJECTION PRN
Status: DISCONTINUED | OUTPATIENT
Start: 2021-07-09 | End: 2021-07-09 | Stop reason: ALTCHOICE

## 2021-07-09 RX ORDER — ISOSORBIDE MONONITRATE 30 MG/1
30 TABLET, EXTENDED RELEASE ORAL DAILY
Status: DISCONTINUED | OUTPATIENT
Start: 2021-07-09 | End: 2021-07-09 | Stop reason: HOSPADM

## 2021-07-09 RX ORDER — RANOLAZINE 1000 MG/1
1000 TABLET, EXTENDED RELEASE ORAL 2 TIMES DAILY
Qty: 60 TABLET | Refills: 0 | Status: SHIPPED | OUTPATIENT
Start: 2021-07-09

## 2021-07-09 RX ORDER — DEXTROSE MONOHYDRATE 25 G/50ML
12.5 INJECTION, SOLUTION INTRAVENOUS PRN
Status: DISCONTINUED | OUTPATIENT
Start: 2021-07-09 | End: 2021-07-09 | Stop reason: HOSPADM

## 2021-07-09 RX ORDER — ATROPINE SULFATE 0.1 MG/ML
0.5 INJECTION INTRAVENOUS EVERY 5 MIN PRN
Status: DISCONTINUED | OUTPATIENT
Start: 2021-07-09 | End: 2021-07-09 | Stop reason: ALTCHOICE

## 2021-07-09 RX ORDER — ISOSORBIDE MONONITRATE 30 MG/1
30 TABLET, EXTENDED RELEASE ORAL DAILY
Qty: 30 TABLET | Refills: 0 | Status: SHIPPED | OUTPATIENT
Start: 2021-07-10

## 2021-07-09 RX ORDER — DEXTROSE MONOHYDRATE 50 MG/ML
100 INJECTION, SOLUTION INTRAVENOUS PRN
Status: DISCONTINUED | OUTPATIENT
Start: 2021-07-09 | End: 2021-07-09 | Stop reason: HOSPADM

## 2021-07-09 RX ORDER — SODIUM CHLORIDE 0.9 % (FLUSH) 0.9 %
10 SYRINGE (ML) INJECTION PRN
Status: DISCONTINUED | OUTPATIENT
Start: 2021-07-09 | End: 2021-07-09 | Stop reason: HOSPADM

## 2021-07-09 RX ORDER — NITROGLYCERIN 0.4 MG/1
0.4 TABLET SUBLINGUAL EVERY 5 MIN PRN
Status: DISCONTINUED | OUTPATIENT
Start: 2021-07-09 | End: 2021-07-09 | Stop reason: ALTCHOICE

## 2021-07-09 RX ORDER — SODIUM CHLORIDE 9 MG/ML
500 INJECTION, SOLUTION INTRAVENOUS CONTINUOUS PRN
Status: DISCONTINUED | OUTPATIENT
Start: 2021-07-09 | End: 2021-07-09 | Stop reason: ALTCHOICE

## 2021-07-09 RX ORDER — RANOLAZINE 500 MG/1
1000 TABLET, EXTENDED RELEASE ORAL 2 TIMES DAILY
Status: DISCONTINUED | OUTPATIENT
Start: 2021-07-09 | End: 2021-07-09 | Stop reason: HOSPADM

## 2021-07-09 RX ADMIN — SULFAMETHOXAZOLE AND TRIMETHOPRIM 1 TABLET: 800; 160 TABLET ORAL at 00:30

## 2021-07-09 RX ADMIN — PANTOPRAZOLE SODIUM 40 MG: 40 TABLET, DELAYED RELEASE ORAL at 08:50

## 2021-07-09 RX ADMIN — DAPSONE 25 MG: 25 TABLET ORAL at 08:49

## 2021-07-09 RX ADMIN — Medication 1000 MCG: at 08:50

## 2021-07-09 RX ADMIN — SODIUM CHLORIDE, PRESERVATIVE FREE 10 ML: 5 INJECTION INTRAVENOUS at 10:27

## 2021-07-09 RX ADMIN — INSULIN LISPRO 6 UNITS: 100 INJECTION, SOLUTION INTRAVENOUS; SUBCUTANEOUS at 13:17

## 2021-07-09 RX ADMIN — CARVEDILOL 12.5 MG: 12.5 TABLET, FILM COATED ORAL at 00:31

## 2021-07-09 RX ADMIN — CETIRIZINE HYDROCHLORIDE 10 MG: 10 TABLET ORAL at 08:50

## 2021-07-09 RX ADMIN — SODIUM CHLORIDE, PRESERVATIVE FREE 10 ML: 5 INJECTION INTRAVENOUS at 08:51

## 2021-07-09 RX ADMIN — GLIPIZIDE 2.5 MG: 5 TABLET ORAL at 08:50

## 2021-07-09 RX ADMIN — SODIUM CHLORIDE, PRESERVATIVE FREE 10 ML: 5 INJECTION INTRAVENOUS at 09:20

## 2021-07-09 RX ADMIN — SULFAMETHOXAZOLE AND TRIMETHOPRIM 1 TABLET: 800; 160 TABLET ORAL at 08:49

## 2021-07-09 RX ADMIN — AMLODIPINE BESYLATE 2.5 MG: 2.5 TABLET ORAL at 00:31

## 2021-07-09 RX ADMIN — GABAPENTIN 600 MG: 600 TABLET ORAL at 00:31

## 2021-07-09 RX ADMIN — TETRAKIS(2-METHOXYISOBUTYLISOCYANIDE)COPPER(I) TETRAFLUOROBORATE 54 MILLICURIE: 1 INJECTION, POWDER, LYOPHILIZED, FOR SOLUTION INTRAVENOUS at 11:04

## 2021-07-09 RX ADMIN — BUMETANIDE 2 MG: 1 TABLET ORAL at 08:49

## 2021-07-09 RX ADMIN — INSULIN LISPRO 2 UNITS: 100 INJECTION, SOLUTION INTRAVENOUS; SUBCUTANEOUS at 00:30

## 2021-07-09 RX ADMIN — CARVEDILOL 12.5 MG: 12.5 TABLET, FILM COATED ORAL at 13:17

## 2021-07-09 RX ADMIN — DULOXETINE HYDROCHLORIDE 60 MG: 30 CAPSULE, DELAYED RELEASE ORAL at 08:50

## 2021-07-09 RX ADMIN — PRASUGREL HYDROCHLORIDE 10 MG: 10 TABLET, FILM COATED ORAL at 08:49

## 2021-07-09 RX ADMIN — RANOLAZINE 500 MG: 500 TABLET, FILM COATED, EXTENDED RELEASE ORAL at 00:31

## 2021-07-09 RX ADMIN — DAPSONE 25 MG: 25 TABLET ORAL at 00:31

## 2021-07-09 RX ADMIN — SODIUM CHLORIDE, PRESERVATIVE FREE 10 ML: 5 INJECTION INTRAVENOUS at 11:04

## 2021-07-09 RX ADMIN — LISINOPRIL 5 MG: 10 TABLET ORAL at 08:50

## 2021-07-09 RX ADMIN — TETRAKIS(2-METHOXYISOBUTYLISOCYANIDE)COPPER(I) TETRAFLUOROBORATE 20.7 MILLICURIE: 1 INJECTION, POWDER, LYOPHILIZED, FOR SOLUTION INTRAVENOUS at 09:20

## 2021-07-09 RX ADMIN — POTASSIUM CHLORIDE 10 MEQ: 10 CAPSULE, COATED, EXTENDED RELEASE ORAL at 00:31

## 2021-07-09 RX ADMIN — ISOSORBIDE MONONITRATE 30 MG: 30 TABLET ORAL at 13:17

## 2021-07-09 RX ADMIN — ASPIRIN 81 MG: 81 TABLET, COATED ORAL at 09:17

## 2021-07-09 RX ADMIN — REGADENOSON 0.4 MG: 0.08 INJECTION, SOLUTION INTRAVENOUS at 11:03

## 2021-07-09 RX ADMIN — RANOLAZINE 500 MG: 500 TABLET, FILM COATED, EXTENDED RELEASE ORAL at 08:50

## 2021-07-09 RX ADMIN — METFORMIN HYDROCHLORIDE 1000 MG: 500 TABLET ORAL at 08:50

## 2021-07-09 NOTE — CONSULTS
Attestation signed by      Attending Physician Statement:    I have discussed the care of  Paris Jules , including pertinent history and exam findings, with the Cardiology fellow/resident. I have seen and examined the patient and the key elements of all parts of the encounter have been performed by me. I agree with the assessment, plan and orders as documented by the fellow/resident, after I modified exam findings and plan of treatments, and the final version is my approved version of the assessment. Additional Comments:   CP- mixed features  Known CAD s/p PCI with patent stents on 2/21, known LAD intermediate lesion with IFR of 0.94  - will add imdur  - increase ranexa  - further risk stratify with a stress test  - if stress test low risk, okay for d/c, follow up with primary cardiologist in 2 weeks. Saranya Hitchcock DO, [facility], [address], [place] [address] [facility]  [facility]. [employer]  [phone]     [facility] Cardiology    Consult / H&P               Today's Date: 7/9/2021  Patient Name: Paris Jules  Date of admission: 7/8/2021  4:14 PM  Patient's age: 48 y.o., 1970  Admission Dx: Chest pain [R07.9]    Reason for Consult:  Cardiac evaluation    Requesting Physician: Lesa Gutierrez MD    CHIEF COMPLAINT: Chest pain    History Obtained From:  patient    HISTORY OF PRESENT ILLNESS:      The patient is a 48 y.o.  male with past medical history of CAD s/p RIAZ of OM, hypertension, hyperlipidemia, diabetes mellitus, GERD presented to the ER with shortness of breath and chest pressure. Started with exertion coming from the parking lot to test.  Get slightly better while taking rest.  denies any fever chills, sick contact, nausea, vomiting. Receive nitroglycerin 2 tablets but did not much improved with this pain. EMS was called and was given pain meds and pain is resolved after that.   He follows at [address] [facility],[address].  Vitally stable with /82, HR 76.  Labs show release tablet Take 500 mg by mouth 2 times daily   Yes Historical Provider, MD   potassium chloride (KLOR-CON) 10 MEQ extended release tablet Take 10 mEq by mouth nightly   Yes Historical Provider, MD   Cholecalciferol (VITAMIN D3) 1.25 MG (53548 UT) CAPS Take by mouth 2x week   Yes Historical Provider, MD   vitamin B-12 (CYANOCOBALAMIN) 1000 MCG tablet Take 1,000 mcg by mouth daily   Yes Historical Provider, MD   amLODIPine (NORVASC) 2.5 MG tablet Take 2.5 mg by mouth nightly   Yes Historical Provider, MD   aspirin 81 MG EC tablet Take 81 mg by mouth daily   Yes Historical Provider, MD   atorvastatin (LIPITOR) 20 MG tablet Take 80 mg by mouth nightly    Yes Historical Provider, MD   cetirizine (ZYRTEC) 10 MG tablet Take 10 mg by mouth daily. Yes Historical Provider, MD   glimepiride (AMARYL) 4 MG tablet Take 4 mg by mouth 2 times daily    Yes Historical Provider, MD   HYDROcodone-acetaminophen (NORCO) 5-325 MG per tablet Take 1-2 tablets by mouth every 6 hours as needed for Pain.  5/28/14   Alberto Rodríguez MD      Current Facility-Administered Medications: insulin lispro (HUMALOG) injection vial 0-6 Units, 0-6 Units, Subcutaneous, TID WC  insulin lispro (HUMALOG) injection vial 0-3 Units, 0-3 Units, Subcutaneous, Nightly  glucose (GLUTOSE) 40 % oral gel 15 g, 15 g, Oral, PRN  dextrose 50 % IV solution, 12.5 g, Intravenous, PRN  glucagon (rDNA) injection 1 mg, 1 mg, Intramuscular, PRN  dextrose 5 % solution, 100 mL/hr, Intravenous, PRN  lisinopril (PRINIVIL;ZESTRIL) tablet 5 mg, 5 mg, Oral, Daily  cetirizine (ZYRTEC) tablet 10 mg, 10 mg, Oral, Daily  meloxicam (MOBIC) tablet 15 mg, 15 mg, Oral, Daily  pantoprazole (PROTONIX) tablet 40 mg, 40 mg, Oral, Daily  bumetanide (BUMEX) tablet 2 mg, 2 mg, Oral, Daily  sodium chloride flush 0.9 % injection 5-40 mL, 5-40 mL, Intravenous, 2 times per day  sodium chloride flush 0.9 % injection 5-40 mL, 5-40 mL, Intravenous, PRN  0.9 % sodium chloride infusion, 25 mL, Intravenous, PRN  acetaminophen (TYLENOL) tablet 650 mg, 650 mg, Oral, Q4H PRN  ondansetron (ZOFRAN-ODT) disintegrating tablet 4 mg, 4 mg, Oral, Q8H PRN **OR** ondansetron (ZOFRAN) injection 4 mg, 4 mg, Intravenous, Q6H PRN  carvedilol (COREG) tablet 12.5 mg, 12.5 mg, Oral, BID  acitretin (SORIATANE) capsule 25 mg, 25 mg, Oral, BID  amLODIPine (NORVASC) tablet 2.5 mg, 2.5 mg, Oral, Nightly  aspirin EC tablet 81 mg, 81 mg, Oral, Daily  dapsone tablet 25 mg, 25 mg, Oral, BID  DULoxetine (CYMBALTA) extended release capsule 60 mg, 60 mg, Oral, Daily  empagliflozin (JARDIANCE) tablet TABS 25 mg, 25 mg, Oral, Daily  gabapentin (NEURONTIN) tablet 600 mg, 600 mg, Oral, Nightly  glipiZIDE (GLUCOTROL) tablet 2.5 mg, 2.5 mg, Oral, BID AC  metFORMIN (GLUCOPHAGE) tablet 1,000 mg, 1,000 mg, Oral, BID WC  potassium chloride (MICRO-K) extended release capsule 10 mEq, 10 mEq, Oral, Nightly  prasugrel (EFFIENT) tablet 10 mg, 10 mg, Oral, Daily  ranolazine (RANEXA) extended release tablet 500 mg, 500 mg, Oral, BID  sulfamethoxazole-trimethoprim (BACTRIM DS;SEPTRA DS) 800-160 MG per tablet 1 tablet, 1 tablet, Oral, BID  vitamin B-12 (CYANOCOBALAMIN) tablet 1,000 mcg, 1,000 mcg, Oral, Daily    Allergies:  Ceclor [cefaclor] and Pcn [penicillins]    Social History:   reports that he has never smoked. He does not have any smokeless tobacco history on file. He reports current alcohol use. He reports that he does not use drugs. Family History: family history is not on file. REVIEW OF SYSTEMS:    · Constitutional: there has been no unanticipated weight loss. There's been No change in energy level, No change in activity level. · Eyes: No visual changes or diplopia. No scleral icterus. · ENT: No Headaches  · Cardiovascular: Hypertension, CAD s/p stent  · Respiratory: No previous pulmonary problems, No cough  · Gastrointestinal: No abdominal pain. No change in bowel or bladder habits.   · Genitourinary: No dysuria, trouble voiding, or forces of Lateral leads  ECHO:     Left Ventricle: There is mild concentric increased wall   thickness/hypertrophy.   Left Ventricle: Systolic function is normal with an ejection fraction   of 55-60%. The EF by visual approximation is 55%.   Left Ventricle: Grade I diastolic dysfunction (impaired relaxation) is   present. Lateral E' is  6.91 cm/s. Medial E' is 5.48 cm/s. Stress Test:    Normal stress rest myocardial SPECT perfusion scan of the heart.    Low  risk for a cardiovascular event. No ischemic ECG changes noted. Myocardial perfusion images will be reported separately. Cardiac Angiography: 02/272021  1. Hemodynamically insignificant lesion in the mid LAD with an IFR of 0.94   x3   2. Patent stent in the diagonal and circumflex coronary artery   3. Mild plaque disease in the right coronary artery   4. LVEDP 12      Recommendations:   1. Optimization of medical therapy of coronary artery disease   2. Follow-up in the office. 01/10/2020  Conclusion:   1.  Significant obstructive coronary disease with high-grade stenosis at   the distal end of the obtuse marginal stent successfully stented with   drug-eluting stent. 2.  Otherwise nonobstructive coronary artery disease with patent stented   area in the major diagonal.   3.  Normal left ventricular systolic function        Labs:     CBC:   Recent Labs     07/08/21  1631   WBC 9.0   HGB 14.9   HCT 46.5        BMP:   Recent Labs     07/08/21  1631   *   K 4.0   CO2 24   BUN 11   CREATININE 1.13   LABGLOM >60   GLUCOSE 370*     BNP: No results for input(s): BNP in the last 72 hours. PT/INR: No results for input(s): PROTIME, INR in the last 72 hours. APTT:No results for input(s): APTT in the last 72 hours. CARDIAC ENZYMES:No results for input(s): CKTOTAL, CKMB, CKMBINDEX, TROPONINI in the last 72 hours.   FASTING LIPID PANEL:No results found for: HDL, LDLDIRECT, LDLCALC, TRIG  LIVER PROFILE:No results for input(s): AST, ALT, LUISA in the last 72 hours. IMPRESSION:      1. Chest pain  2. CAD s/p RIAZ to OM. Patent stent in diagonal and LCx on 02/27/2021  3. Essential hypertension  4. Diabetes mellitus-uncontrolled      RECOMMENDATIONS:  1. Continue aspirin,BB, Ranexa. Will increase his dose of Ranexa  2. Will add Imdur 30 mg daily   3. Will get stress test. If negative can be discharged. 4. Outpatient follow-up with his own cardiologist.     Discussed with patient and Nurse.     Electronically signed by Kristal Mclaughlin MD on 7/9/2021 at 7:39 AM          [phone]

## 2021-07-09 NOTE — ED NOTES
Report given to [facility] - Nurse stated understanding and had no further questions or concerns.       Gregory Mix, RN  07/08/21 2028

## 2021-07-09 NOTE — H&P
[address] [email or web address]  CDU / OBSERVATION ENCOUNTER  RESIDENT NOTE     Pt Name: Tani Dewitt  MRN: [id number]  [place] 1970  Date of evaluation: 7/8/21  Patient's PCP is :  Katheryn       Chief Complaint   Patient presents with    Chest Pain     Pt states chest pain while at work. pain rated 6/10         HISTORY OF PRESENT ILLNESS    Tani Dewitt is a 48 y.o. male who presents with complaints of left side chest pain with associated shortness of breath. Patient states his shortness of breath is worse with exertion. Patient states he has 4 stents in place and had a cardiac cath in February at [facility] that showed \"60 % stenosis\". Patient currently denies fever, chills, nausea and vomiting. Location/Symptom: left side chest pain  Timing/Onset: 1 day  Provocation: unknown  Quality: sharp  Radiation: n/a  Severity: 9/10  Timing/Duration: intermittment  Modifying Factors: n/a    REVIEW OF SYSTEMS       Review of Systems   Constitutional: Positive for fatigue. Negative for chills and fever. HENT: Negative for trouble swallowing. Eyes: Negative for visual disturbance. Respiratory: Positive for shortness of breath. Cardiovascular: Positive for chest pain. Gastrointestinal: Negative for abdominal pain, nausea and vomiting. Neurological: Negative for dizziness, light-headedness and headaches. Psychiatric/Behavioral: Negative for agitation and behavioral problems. (PQRS) Advance directives on face sheet per hospital policy. No change unless specifically mentioned in chart    PAST MEDICAL HISTORY    has a past medical history of Chronic pain, Depression, Diabetes mellitus ([address] Edema, GERD (gastroesophageal reflux disease), Hyperlipidemia, Hypertension, Kidney calculi, and Vertigo. I have reviewed the past medical history with the patient and it is pertinent to this complaint.       SURGICAL HISTORY      has a past surgical history that includes Tonsillectomy; Knee arthroscopy (Right); Spinal fusion; Knee arthroscopy (Left); Nerve Surgery; and tumor removal.  I have reviewed and agree with Surgical History entered and it is pertinent to this complaint. CURRENT MEDICATIONS     lactated ringers infusion 1,000 mL, Once  lisinopril (PRINIVIL;ZESTRIL) tablet 5 mg, Daily  cetirizine (ZYRTEC) tablet 10 mg, Daily  meloxicam (MOBIC) tablet 15 mg, Daily  pantoprazole (PROTONIX) tablet 40 mg, Daily  bumetanide (BUMEX) tablet 2 mg, Daily  sodium chloride flush 0.9 % injection 5-40 mL, 2 times per day  sodium chloride flush 0.9 % injection 5-40 mL, PRN  0.9 % sodium chloride infusion, PRN  acetaminophen (TYLENOL) tablet 650 mg, Q4H PRN  ondansetron (ZOFRAN-ODT) disintegrating tablet 4 mg, Q8H PRN   Or  ondansetron (ZOFRAN) injection 4 mg, Q6H PRN  carvedilol (COREG) tablet 12.5 mg, BID        All medication charted and reviewed. ALLERGIES     is allergic to ceclor [cefaclor] and pcn [penicillins]. FAMILY HISTORY     has no family status information on file. family history is not on file. The patient denies any pertinent family history. I have reviewed and agree with the family history entered. I have reviewed the Family History and it is not significant to the case    SOCIAL HISTORY      reports that he has never smoked. He does not have any smokeless tobacco history on file. He reports current alcohol use. He reports that he does not use drugs. I have reviewed and agree with all Social.  There are no concerns for substance abuse/use. PHYSICAL EXAM     INITIAL VITALS:  oral temperature is 97.3 °F (36.3 °C). His blood pressure is 131/80 and his pulse is 78. His respiration is 20 and oxygen saturation is 97%. Physical Exam  Vitals and nursing note reviewed. HENT:      Head: Normocephalic. Mouth/Throat:      Pharynx: Oropharynx is clear. Eyes:      Pupils: Pupils are equal, round, and reactive to light.    Cardiovascular:      Rate and Rhythm: Normal rate. Pulses: Normal pulses. Heart sounds: Normal heart sounds. Pulmonary:      Effort: Pulmonary effort is normal.      Breath sounds: Normal breath sounds. Musculoskeletal:         General: Normal range of motion. Cervical back: Normal range of motion. Skin:     General: Skin is warm. Neurological:      General: No focal deficit present. Mental Status: He is alert and oriented to person, place, and time. Psychiatric:         Mood and Affect: Mood normal.             DIFFERENTIAL DIAGNOSIS/MDM:     DDx: Unstable angina    DIAGNOSTIC RESULTS       RADIOLOGY:   I directly visualized the following  images and reviewed the radiologist interpretations:    XR CHEST (2 VW)    Result Date: 7/8/2021  EXAMINATION: TWO XRAY VIEWS OF THE CHEST 7/8/2021 4:52 pm COMPARISON: September 22, 2010 chest examination HISTORY: ORDERING SYSTEM PROVIDED HISTORY: chest pain TECHNOLOGIST PROVIDED HISTORY: chest pain FINDINGS: Normal cardiopericardial silhouette There are no significant pleural, parenchymal, mediastinal or osseous findings     No acute cardiopulmonary findings       LABS:  I have reviewed and interpreted all available lab results.   Labs Reviewed   CBC WITH AUTO DIFFERENTIAL - Abnormal; Notable for the following components:       Result Value    Immature Granulocytes 1 (*)     All other components within normal limits   BASIC METABOLIC PANEL W/ REFLEX TO MG FOR LOW K - Abnormal; Notable for the following components:    Glucose 370 (*)     Sodium 132 (*)     Chloride 92 (*)     All other components within normal limits   LIPASE   TROPONIN   BRAIN NATRIURETIC PEPTIDE   D-DIMER, QUANTITATIVE   TROPONIN       SCREENING TOOLS:    HEART Risk Score for Chest Pain Patients   History and Physical Exam Suspicion Level  (Nausea, Vomiting, Diaphoresis, Radiation, Exertion)   Slightly Suspicious (0 pts)   Moderately Suspicious (1 pt)   Highly Suspicious (2 pts)   EKG Interpretation   Normal (0 pts)   Non-Specific Repolarization Disturbance (1 pt)   Significant ST-Depression (2 pts)   Age of Patient (in years)   = 39 (0 pts)   46-64 (1 pt)   = 65 (2 pts)   Risk Factors   No Risk Factors (0 pts)   1-2 Risk Factors (1 pt)   = 3 Risk Factors (2 pts)   Risk Factors Include:   Hypercholesterolemia   Hypertension   Diabetes Mellitus   Cigarette smoking   Positive family history   Obesity   CAD   (SLE, CKDz, HIV, Cocaine abuse)   Troponin Levels   = Normal Limit (0 pts)   1-3 Times Normal Limit (1 pt)   > 3 Times Normal Limit (2 pts)  TOTAL:    Percent Risk for Major Adverse Cardiac Event (MACE)  0-3 pts indicates low risk for MACE   2.5% (DISCHARGE)   4-7 pts indicates moderate risk for MACE  20.3% (OBS)  8-10 pts indicates high risk for MACE  72.7% (EARLY INVASIVE TX)    CDU IMPRESSION / PLAN      Kj Christian is a 48 y.o. male who presents with  complaints of left side chest pain with associated shortness of breath. Patient states his shortness of breath is worse with exertion. Patient states he has 4 stents in place and had a cardiac cath in February at [facility] that showed \"60 % stenosis\". Given clinical presentation will admit for further evaluation. 1. Inpatient consult to cardiology  · Symptom management  · Continue home medications and pain control  · Monitor vitals, labs, and imaging  · DISPO: pending consults and clinical improvement    CONSULTS:    IP CONSULT TO CARDIOLOGY    PROCEDURES:  Not indicated       PATIENT REFERRED TO:    No follow-up provider specified. --  My Supervising Physician for today is Dr. Keegan Ellsworth  We discussed and agreed upon a treatment plan  Jostin Morgan, [address] [place] [address]   Emergency Medicine Resident     This dictation was generated by voice recognition computer software. Although all attempts are made to edit the dictation for accuracy, there may be errors in the transcription that are not intended.

## 2021-07-09 NOTE — PROCEDURES
[address] [facility] [address]                              CARDIAC STRESS TEST    PATIENT NAME: Jason Serna                  :        1970  MED REC NO:   [id number]                             ROOM:       60  ACCOUNT NO:   [de-identified]                           ADMIT DATE: 2021  PROVIDER:     Mendez Tucker    DATE OF STUDY:  2021    ORDERING PROVIDER:  Baron Kameron MD  INTERPRETING PHYSICIAN:  Mendez Tucker MD    LEXISCAN STRESS STUDY:  Indication:  chest pain    Medications:  Lexiscan, 0.4 mg  Resting heart rate:  84 bpm  Resting blood pressure:  157/89 mm/Hg  Infusion heart rate:  104 bpm  Infusion blood pressure:  130/68 mm/Hg  Resting EKG:  Normal sinus rhythm/right axis deviation/nonspecific ST-T  waves)  Stress heart response:  Normal response  Stress BP response:  Appropriate  Stress EKG(S):  No changes seen  Chest discomfort:  None  Ischemic EKG changes:  None    IMPRESSION:  Electrocardiographically negative Lexiscan stress study. Radio-isotope  results to follow from the department of Nuclear Medicine.             [facility]    D: 2021 14:38:28       T: 2021 14:39:47     /CANDY  Job#: [id number]     Doc#: Unknown    CC:    ()

## 2021-07-09 NOTE — DISCHARGE SUMMARY
CDU Discharge Summary        Patient:  Mendez Nogueira  YOB: 1970    MRN: [id number]   Acct: [de-identified]    Primary Care Physician: Kathryn Anders    Admit date:  7/8/2021  4:14 PM  Discharge date: 7/9/2021    Discharge Diagnoses:     Acute chest pain due to CAD  Improved with rest    Follow-up:  Call today/tomorrow for a follow up appointment with Kathryn Anders , or return to the Emergency Room with worsening symptoms    Stressed to patient the importance of following up with primary care doctor for further workup/management of symptoms. Pt verbalizes understanding and agrees with plan. Discharge Medications: Isosorbide mononitrate 80 mg, increase Ranexa from 500mg twice daily to 1000mg twice daily        Clayton Stands   Home Medication Instructions [id number]    Printed on:07/09/21 1153   Medication Information                      acitretin (SORIATANE) 25 MG capsule  Take 25 mg by mouth 2 times daily             amLODIPine (NORVASC) 2.5 MG tablet  Take 2.5 mg by mouth nightly             aspirin 81 MG EC tablet  Take 81 mg by mouth daily             atorvastatin (LIPITOR) 20 MG tablet  Take 80 mg by mouth nightly              bumetanide (BUMEX) 2 MG tablet  Take 2 mg by mouth 2 times daily             carvedilol (COREG) 12.5 MG tablet  Take 12.5 mg by mouth 2 times daily (with meals)             cetirizine (ZYRTEC) 10 MG tablet  Take 10 mg by mouth daily. Cholecalciferol (VITAMIN D3) 1.25 MG (40257 UT) CAPS  Take by mouth 2x week             dapsone 25 MG tablet  Take 25 mg by mouth 2 times daily             DULoxetine (CYMBALTA) 60 MG extended release capsule  Take 60 mg by mouth daily             empagliflozin (JARDIANCE) 25 MG tablet  Take 25 mg by mouth daily             famotidine (PEPCID) 20 MG tablet  Take 20 mg by mouth 2 times daily             gabapentin (NEURONTIN) 300 MG capsule  Take 600 mg by mouth nightly.              glimepiride (AMARYL) 4 MG tablet  Take 4 NOT REPORTED     RBC Morphology NOT REPORTED     Platelet Estimate NOT REPORTED    Basic Metabolic Panel w/ Reflex to MG   Result Value Ref Range    Glucose 370 (H) 70 - 99 mg/dL    BUN 11 6 - 20 mg/dL    CREATININE 1.13 0.70 - 1.20 mg/dL    Bun/Cre Ratio NOT REPORTED 9 - 20    Calcium 9.3 8.6 - 10.4 mg/dL    Sodium 132 (L) 135 - 144 mmol/L    Potassium 4.0 3.7 - 5.3 mmol/L    Chloride 92 (L) 98 - 107 mmol/L    CO2 24 20 - 31 mmol/L    Anion Gap 16 9 - 17 mmol/L    GFR Non-African American >60 >60 mL/min    GFR African American >60 >60 mL/min    GFR Comment          GFR Staging NOT REPORTED    Lipase   Result Value Ref Range    Lipase 47 13 - 60 U/L   Troponin   Result Value Ref Range    Troponin, High Sensitivity 18 0 - 22 ng/L    Troponin T NOT REPORTED <0.03 ng/mL    Troponin Interp NOT REPORTED    Brain Natriuretic Peptide   Result Value Ref Range    Pro-BNP <20 <300 pg/mL    BNP Interpretation Pro-BNP Reference Range:    D-Dimer, Quantitative   Result Value Ref Range    D-Dimer, Quant 0.41 mg/L FEU   Troponin   Result Value Ref Range    Troponin, High Sensitivity 17 0 - 22 ng/L    Troponin T NOT REPORTED <0.03 ng/mL    Troponin Interp NOT REPORTED    POC Glucose Fingerstick   Result Value Ref Range    POC Glucose 306 (H) 75 - 110 mg/dL   POC Glucose Fingerstick   Result Value Ref Range    POC Glucose 227 (H) 75 - 110 mg/dL   POC Glucose Fingerstick   Result Value Ref Range    POC Glucose 304 (H) 75 - 110 mg/dL   EKG 12 Lead   Result Value Ref Range    Ventricular Rate 110 BPM    Atrial Rate 110 BPM    P-R Interval 174 ms    QRS Duration 74 ms    Q-T Interval 336 ms    QTc Calculation (Bazett) 454 ms    P Axis 44 degrees    R Axis -27 degrees    T Axis 23 degrees   EKG 12 Lead   Result Value Ref Range    Ventricular Rate 74 BPM    Atrial Rate 74 BPM    P-R Interval 146 ms    QRS Duration 92 ms    Q-T Interval 418 ms    QTc Calculation (Bazett) 463 ms    P Axis -12 degrees    R Axis -19 degrees    T Axis 4 degrees     XR CHEST (2 VW)    Result Date: 7/8/2021  EXAMINATION: TWO XRAY VIEWS OF THE CHEST 7/8/2021 4:52 pm COMPARISON: September 22, 2010 chest examination HISTORY: ORDERING SYSTEM PROVIDED HISTORY: chest pain TECHNOLOGIST PROVIDED HISTORY: chest pain FINDINGS: Normal cardiopericardial silhouette There are no significant pleural, parenchymal, mediastinal or osseous findings     No acute cardiopulmonary findings     NM Cardiac Stress Test Nuclear Imaging    Result Date: 7/9/2021  EXAMINATION: MYOCARDIAL PERFUSION IMAGING 7/9/2021 9:41 am TECHNIQUE: Rest dose:  20.7 mCi Tc-99m sestamibi intravenously Stress dose:  54 mCi Tc-99m sestamibi intravenously Under cardiology supervision, 0.4 mg Lexiscan was infused intravenously prior to injection of the stress dose. SPECT imaging was acquired following injection of the sestamibi. ECG gating was obtained following the stress acquisition. COMPARISON: None Available. HISTORY: ORDERING SYSTEM PROVIDED HISTORY: Chest pain TECHNOLOGIST PROVIDED HISTORY: Reason for Exam: Chest pain Procedure Type->Rx CP Reason for Exam: chest pain, palpitations, short of breath, lightheaded, sweating, nausea, cath 2/21 with 4 stents, HTN, DM family history of heart disease 63-year-old male with chest pain FINDINGS: The patient achieved a maximum heart rate of 104 beats per minute, 61 % of the maximum age predicted heart rate of 170 beats per minute. Perfusion: There is no scintigraphic evidence for a reversible or fixed perfusion defect to suggest reversible ischemia or infarct. Function: The gated SPECT data demonstrates normal left ventricular size and normal wall motion. Left ventricular ejection fraction:  65% TID score:  1.23 (threshold value of 1.39 is used for Lexiscan stress with Tc-99m). There is no stress-induced cavitary dilatation to suggest compensated triple vessel disease. End diastolic volume:  354KZ Scores are visually adjusted to account for potential artifact.  Summed stress score:  0 Summed rest score:  0 Summed reversibility score:  0     1. No definitive scintigraphic evidence for reversible ischemia or infarct. 2. Left ventricular ejection fraction of 65%. 3.  Please see report for EKG portion of the examination which will be performed separately by physician from cardiology. Risk stratification:  Low risk Note:  Risk stratification incorporates both clinical history and test results. Final risk determination is the responsibility of the ordering provider as history and other test results may increase or decrease the risk stratification reported for this examination. Risk stratification criteria are adapted from \"Noninvasive Risk Stratification\" criteria from [employer]. Al, ACC/AATS/AHA/ASE/ASNC/SCAI/SCCT/STS 2017 Appropriate Use Criteria For Coronary Revascularization in Patients With Stable Ischemic Heart Disease [facility] Volume 69, Issue 17, May 2017 High risk (>3% annual death or MI) 1. Severe resting LV dysfunction (LVEF >35%) not readily explained by non coronary causes 2. Resting perfusion abnormalities greater than 10% of the myocardium in patients without prior history or evidence of MI 3. Stress-induced perfusion abnormalities encumbering greater than or equal to 10% myocardium or stress segmental scores indicating multiple vascular territories with abnormalities 4. Stress-induced LV dilatation (TID ratio greater than 1.19 for exercise and greater than 1.39 for regadenoson) Intermediate risk (1% to 3% annual death or MI) 1. Mild/moderate resting LV dysfunction (LVEF 35% to 49%) not readily explained by non coronary causes. 2.  Resting perfusion abnormalities in 5%-9.9% of the myocardium in patients without a history or prior evidence of MI 3. Stress-induced perfusion abnormality encumbering 5%-9.9% of the myocardium or stress segmental scores indicating 1 vascular territory with abnormalities but without LV dilation 4.   Small wall motion abnormality involving 1-2 segments and only 1 coronary bed. Low Risk (Less than 1% annual death or MI) 1. Normal or small myocardial perfusion defect at rest or with stress encumbering less than 5% of the myocardium. This examination was read in conjunction with the cardiology fellow, Dr. Nelli Tello and Dr. Kee Keyes           Physical Exam:    General appearance - NAD, AOx 3   Lungs -CTAB, no R/R/R  Heart - RRR, no M/R/G  Abdomen - Soft, NT/ND  Neurological:  MAEx4, No focal motor deficit, sensory loss  Extremities - Cap refil <2 sec in all ext., no edema  Skin -warm, dry      Hospital Course:  Clinical course has improved, labs and imaging reviewed. Paris Jules originally presented to the hospital on 7/8/2021  4:14 PM. with left-sided chest pain associated with shortness of breath. Symptoms are worse with exertion. At that time it was determined that He required further observation and cardiac work-up. He was admitted and labs and imaging were followed daily. Imaging results as above. Stress test negative. Cardiology recommended to add isosorbide mononitrate 30 mg as well as to increase the dose of Ranexa from 500 mg twice daily to 1000 mg twice daily. Chest pain resolved with rest.  He is medically stable to be discharged. Disposition: Home    Condition: Good    Patient stable and ready for discharge home. I have discussed plan of care with patient and they are in understanding. They were instructed to read discharge paperwork. All of their questions and concerns were addressed. Time Spent: 0 day      --  Brit Vences MD  Emergency Medicine Resident Physician    This dictation was generated by voice recognition computer software. Although all attempts are made to edit the dictation for accuracy, there may be errors in the transcription that are not intended.

## 2021-07-09 NOTE — PROGRESS NOTES
OBS/CDU   RESIDENT NOTE      Patients PCP is:  Alda Cruz [facility]        SUBJECTIVE      No acute events overnight. Patient's chest pain has improved since yesterday, currently a 0-1/10. Continues to endorse shortness of breath and a slight increase in chest pain with exertion. He states the furthest he has attempted to walk is the bathroom due to a slight increase in pain. He denies fever, abdominal pain, nausea or vomiting. PHYSICAL EXAM      General: NAD, AO X 3  Heent: EMOI, PERRL  Neck: SUPPLE, NO JVD  Cardiovascular: RRR, S1S2   Pulmonary: CTAB, NO SOB  Abdomen: SOFT, NTTP, ND  Extremities: +2/4 PULSES DISTAL, NO SWELLING  Neuro / Psych: NO NUMBNESS OR TINGLING, MENTATION AT BASELINE    PERTINENT TEST /EXAMS      I have reviewed all available laboratory results.     MEDICATIONS CURRENT   insulin lispro (HUMALOG) injection vial 0-6 Units, TID WC  insulin lispro (HUMALOG) injection vial 0-3 Units, Nightly  glucose (GLUTOSE) 40 % oral gel 15 g, PRN  dextrose 50 % IV solution, PRN  glucagon (rDNA) injection 1 mg, PRN  dextrose 5 % solution, PRN  lisinopril (PRINIVIL;ZESTRIL) tablet 5 mg, Daily  cetirizine (ZYRTEC) tablet 10 mg, Daily  meloxicam (MOBIC) tablet 15 mg, Daily  pantoprazole (PROTONIX) tablet 40 mg, Daily  bumetanide (BUMEX) tablet 2 mg, Daily  sodium chloride flush 0.9 % injection 5-40 mL, 2 times per day  sodium chloride flush 0.9 % injection 5-40 mL, PRN  0.9 % sodium chloride infusion, PRN  acetaminophen (TYLENOL) tablet 650 mg, Q4H PRN  ondansetron (ZOFRAN-ODT) disintegrating tablet 4 mg, Q8H PRN   Or  ondansetron (ZOFRAN) injection 4 mg, Q6H PRN  carvedilol (COREG) tablet 12.5 mg, BID  acitretin (SORIATANE) capsule 25 mg, BID  amLODIPine (NORVASC) tablet 2.5 mg, Nightly  aspirin EC tablet 81 mg, Daily  dapsone tablet 25 mg, BID  DULoxetine (CYMBALTA) extended release capsule 60 mg, Daily  empagliflozin (JARDIANCE) tablet TABS 25 mg, Daily  gabapentin (NEURONTIN) tablet 600 mg, Nightly  glipiZIDE (GLUCOTROL) tablet 2.5 mg, BID AC  metFORMIN (GLUCOPHAGE) tablet 1,000 mg, BID WC  potassium chloride (MICRO-K) extended release capsule 10 mEq, Nightly  prasugrel (EFFIENT) tablet 10 mg, Daily  ranolazine (RANEXA) extended release tablet 500 mg, BID  sulfamethoxazole-trimethoprim (BACTRIM DS;SEPTRA DS) 800-160 MG per tablet 1 tablet, BID  vitamin B-12 (CYANOCOBALAMIN) tablet 1,000 mcg, Daily    All medication charted and reviewed. CONSULTS      IP CONSULT TO CARDIOLOGY    ASSESSMENT/PLAN       Alba Del Valle is a 48 y.o. male who presents with history of 4 stents and recent cath in February, who presents with left-sided chest pain associated with shortness of breath. · Cardiology consult  · Stress test negative  · We will add imdur and increase Ranexa  · Recommended follow-up with primary cardiologist in 2 weeks  · Continue home medications and pain control  · Monitor vitals, labs, and imaging  · DISPO: pending consults and clinical improvement    --  Neil Fry MD  Emergency Medicine Resident Physician     This dictation was generated by voice recognition computer software. Although all attempts are made to edit the dictation for accuracy, there may be errors in the transcription that are not intended.

## 2021-07-09 NOTE — PLAN OF CARE
Pt denies pain at time of discharge. Printed discharge instructions given and explained to pt. Pt relates understanding and cooperation.

## 2021-07-10 NOTE — PROGRESS NOTES
[address]  CDU / OBSERVATION ENCOUNTER  ATTENDING NOTE       I performed a history and physical examination of the patient and discussed management with the resident or midlevel provider. I reviewed the resident or midlevel provider's note and agree with the documented findings and plan of care. Any areas of disagreement are noted on the chart. I was personally present for the key portions of any procedures. I have documented in the chart those procedures where I was not present during the key portions. I have reviewed the nurses notes. I agree with the chief complaint, past medical history, past surgical history, allergies, medications, social and family history as documented unless otherwise noted below. The Family history, social history, and ROS are effectively unchanged since admission unless noted elsewhere in the chart. Patient for cardiac evaluation. Patient had stress testing performed which was negative. Patient with symptoms resolved.   In light of negative stress testing and resolved symptoms patient discharged in good condition    Jorge Bird MD  Attending Emergency  Physician

## 2023-12-01 ENCOUNTER — INITIAL CONSULT (OUTPATIENT)
Dept: PAIN MANAGEMENT | Age: 53
End: 2023-12-01
Payer: COMMERCIAL

## 2023-12-01 VITALS — BODY MASS INDEX: 46.65 KG/M2 | WEIGHT: 315 LBS | HEIGHT: 69 IN

## 2023-12-01 DIAGNOSIS — M47.812 CERVICAL SPONDYLOSIS: ICD-10-CM

## 2023-12-01 DIAGNOSIS — Z79.01 LONG TERM (CURRENT) USE OF ANTICOAGULANTS: ICD-10-CM

## 2023-12-01 DIAGNOSIS — Z79.02 ENCOUNTER FOR CURRENT LONG TERM USE OF ANTIPLATELET DRUG: ICD-10-CM

## 2023-12-01 DIAGNOSIS — M54.12 CERVICAL RADICULOPATHY: Primary | ICD-10-CM

## 2023-12-01 DIAGNOSIS — G89.29 CHRONIC BILATERAL LOW BACK PAIN WITHOUT SCIATICA: ICD-10-CM

## 2023-12-01 DIAGNOSIS — M96.1 LUMBAR POST-LAMINECTOMY SYNDROME: ICD-10-CM

## 2023-12-01 DIAGNOSIS — E66.01 CLASS 3 SEVERE OBESITY WITH BODY MASS INDEX (BMI) OF 50.0 TO 59.9 IN ADULT, UNSPECIFIED OBESITY TYPE, UNSPECIFIED WHETHER SERIOUS COMORBIDITY PRESENT (HCC): ICD-10-CM

## 2023-12-01 DIAGNOSIS — M53.3 SACROILIAC JOINT PAIN: ICD-10-CM

## 2023-12-01 DIAGNOSIS — M50.30 DEGENERATIVE DISC DISEASE, CERVICAL: ICD-10-CM

## 2023-12-01 DIAGNOSIS — M54.50 CHRONIC BILATERAL LOW BACK PAIN WITHOUT SCIATICA: ICD-10-CM

## 2023-12-01 PROCEDURE — 99204 OFFICE O/P NEW MOD 45 MIN: CPT | Performed by: STUDENT IN AN ORGANIZED HEALTH CARE EDUCATION/TRAINING PROGRAM

## 2023-12-01 NOTE — PROGRESS NOTES
[facility]    Orders Placed This Encounter    Cervical/Thoracic Epidural Steroid Injection     Standing Status:   Future     Standing Expiration Date:   12/1/2024     Scheduling Instructions:      Cervical C7-T1 interlaminar epidural steroid injection      Diabetic      Hold Plavix 7 days      Hold Eliquis 3 days

## 2024-01-02 ENCOUNTER — HOSPITAL ENCOUNTER (OUTPATIENT)
Dept: PAIN MANAGEMENT | Facility: CLINIC | Age: 54
Discharge: HOME | End: 2024-01-02
Payer: COMMERCIAL

## 2024-01-02 VITALS
HEART RATE: 88 BPM | WEIGHT: 315 LBS | HEIGHT: 69 IN | SYSTOLIC BLOOD PRESSURE: 157 MMHG | DIASTOLIC BLOOD PRESSURE: 80 MMHG | TEMPERATURE: 97.6 F | RESPIRATION RATE: 12 BRPM | BODY MASS INDEX: 46.65 KG/M2 | OXYGEN SATURATION: 97 %

## 2024-01-02 DIAGNOSIS — R52 PAIN MANAGEMENT: ICD-10-CM

## 2024-01-02 PROCEDURE — 62321 NJX INTERLAMINAR CRV/THRC: CPT

## 2024-01-02 PROCEDURE — 6360000004 HC RX CONTRAST MEDICATION: Performed by: STUDENT IN AN ORGANIZED HEALTH CARE EDUCATION/TRAINING PROGRAM

## 2024-01-02 PROCEDURE — 6360000002 HC RX W HCPCS: Performed by: STUDENT IN AN ORGANIZED HEALTH CARE EDUCATION/TRAINING PROGRAM

## 2024-01-02 PROCEDURE — 62321 NJX INTERLAMINAR CRV/THRC: CPT | Performed by: STUDENT IN AN ORGANIZED HEALTH CARE EDUCATION/TRAINING PROGRAM

## 2024-01-02 PROCEDURE — 2500000003 HC RX 250 WO HCPCS: Performed by: STUDENT IN AN ORGANIZED HEALTH CARE EDUCATION/TRAINING PROGRAM

## 2024-01-02 PROCEDURE — 2580000003 HC RX 258: Performed by: STUDENT IN AN ORGANIZED HEALTH CARE EDUCATION/TRAINING PROGRAM

## 2024-01-02 RX ORDER — DILTIAZEM HYDROCHLORIDE 120 MG/1
120 CAPSULE, EXTENDED RELEASE ORAL 2 TIMES DAILY
COMMUNITY

## 2024-01-02 RX ORDER — CLOPIDOGREL BISULFATE 75 MG/1
75 TABLET ORAL DAILY
COMMUNITY

## 2024-01-02 RX ORDER — SODIUM CHLORIDE 0.9 % (FLUSH) 0.9 %
SYRINGE (ML) INJECTION
Status: COMPLETED | OUTPATIENT
Start: 2024-01-02 | End: 2024-01-02

## 2024-01-02 RX ORDER — LIDOCAINE HYDROCHLORIDE 10 MG/ML
INJECTION, SOLUTION EPIDURAL; INFILTRATION; INTRACAUDAL; PERINEURAL
Status: COMPLETED | OUTPATIENT
Start: 2024-01-02 | End: 2024-01-02

## 2024-01-02 RX ORDER — ERGOCALCIFEROL 1.25 MG/1
50000 CAPSULE ORAL WEEKLY
COMMUNITY

## 2024-01-02 RX ORDER — DEXAMETHASONE SODIUM PHOSPHATE 10 MG/ML
INJECTION, SOLUTION INTRAMUSCULAR; INTRAVENOUS
Status: COMPLETED | OUTPATIENT
Start: 2024-01-02 | End: 2024-01-02

## 2024-01-02 RX ORDER — OMEPRAZOLE 20 MG/1
40 CAPSULE, DELAYED RELEASE ORAL DAILY
COMMUNITY

## 2024-01-02 RX ADMIN — LIDOCAINE HYDROCHLORIDE 5 ML: 10 INJECTION, SOLUTION EPIDURAL; INFILTRATION; INTRACAUDAL at 10:26

## 2024-01-02 RX ADMIN — IOHEXOL 3 ML: 180 INJECTION INTRAVENOUS at 10:27

## 2024-01-02 RX ADMIN — Medication 2 ML: at 10:28

## 2024-01-02 RX ADMIN — DEXAMETHASONE SODIUM PHOSPHATE 20 MG: 10 INJECTION, SOLUTION INTRAMUSCULAR; INTRAVENOUS at 10:28

## 2024-01-02 ASSESSMENT — PAIN DESCRIPTION - DESCRIPTORS: DESCRIPTORS: SHARP;STABBING;BURNING

## 2024-01-02 ASSESSMENT — PAIN - FUNCTIONAL ASSESSMENT
PAIN_FUNCTIONAL_ASSESSMENT: 0-10
PAIN_FUNCTIONAL_ASSESSMENT: PREVENTS OR INTERFERES WITH ALL ACTIVE AND SOME PASSIVE ACTIVITIES

## 2024-01-02 ASSESSMENT — PAIN SCALES - GENERAL: PAINLEVEL_OUTOF10: 3

## 2024-01-02 NOTE — OP NOTE
PROCEDURE PERFORMED: Cervical Interlaminar Epidural Steroid Injection using Fluoroscopy    PREOPERATIVE DIAGNOSIS: Cervical radiculopathy    INDICATIONS: Radicular arm pain    The patient's history and physical exam were reviewed.  The risk, benefits, and alternatives of the procedure were discussed and all questions were answered to the patient's satisfaction.  The patient agreed to proceed and written informed consent was obtained.    POSTOPERATIVE DIAGNOSIS: Cervical radiculopathy    PHYSICIAN:  Dr. Kale Correa DO    ANESTHESIA:  LOCAL    ASSISTANT:  NONE    PATHOLOGY:  NONE    ESTIMATED BLOOD LOSS:  N/A    IMPLANTS:  NONE    PROCEDURE DESCRIPTION: Cervical interlaminar epidural injection using fluoroscopy    The patient was placed on the operative bed in prone position.  The area was prepped with  Chlorhexidine.  The area was then draped in a sterile fashion.  An AP fluoroscopic  film was taken to identify the C7 and T1 vertebral bodies, as well as the C7-T1 interspace.  The skin and subcutaneous tissue was anesthetized using 1% preservative-free lidocaine.  Then a 18-gauge 3-1/2 inch Touhy needle was advanced using AP and contralateral oblique fluoroscopic views into the C7-T1 interlaminar space.  Once the needle tip was engaged in ligamentum flavum, a loss-of-resistance syringe was attached.  A loss of resistance was obtained.  After negative aspiration, contrast was injected under AP view with live fluoroscopy and confirmed adequate spread along the nerve root and in the epidural space.  There was no evidence of intravascular uptake or intrathecal spread on imaging.  A lateral view was also taken confirming adequate epidural spread.  At this point, after negative aspiration, a total volume of treatment injectate consisting of 20 mg Dexamethasone and 2 mL of preservative-free normal saline was injected easily.  The needle was then flushed with preservative-free normal saline and removed.  The needle

## 2024-01-02 NOTE — DISCHARGE INSTRUCTIONS

## 2024-01-02 NOTE — H&P
Pain Pre-Op H&P Note    SUBJECTIVE:  No chief complaint on file.      History of Present Illness:   Arben Chow is a 53 y.o. male who presents with arm pain.    Past Medical History:   Diagnosis Date    Chronic pain     Depression     Diabetes mellitus (HCC)     Edema     GERD (gastroesophageal reflux disease)     Hyperlipidemia     Hypertension     Kidney calculi     Vertigo        Past Surgical History:   Procedure Laterality Date    KNEE ARTHROSCOPY Right     KNEE ARTHROSCOPY Left     NERVE SURGERY      SPINAL FUSION      TONSILLECTOMY      TUMOR REMOVAL      left leg       History reviewed. No pertinent family history.    Social History     Socioeconomic History    Marital status:      Spouse name: Not on file    Number of children: Not on file    Years of education: Not on file    Highest education level: Not on file   Occupational History    Not on file   Tobacco Use    Smoking status: Never    Smokeless tobacco: Not on file   Substance and Sexual Activity    Alcohol use: Yes     Comment: Socially    Drug use: No    Sexual activity: Not on file   Other Topics Concern    Not on file   Social History Narrative    Not on file     Social Determinants of Health     Financial Resource Strain: Not on file   Food Insecurity: Not on file   Transportation Needs: Not on file   Physical Activity: Not on file   Stress: Not on file   Social Connections: Not on file   Intimate Partner Violence: Not on file   Housing Stability: Not on file       Medications & Allergies:   Current Outpatient Medications   Medication Instructions    acitretin (SORIATANE) 25 mg, 2 TIMES DAILY    amLODIPine (NORVASC) 2.5 mg, Oral, NIGHTLY    apixaban (ELIQUIS) 5 MG TABS tablet Oral, 2 TIMES DAILY    aspirin 81 mg, DAILY    atorvastatin (LIPITOR) 80 mg, Oral, NIGHTLY    bumetanide (BUMEX) 2 mg, Oral, 2 TIMES DAILY    carvedilol (COREG) 12.5 mg, Oral, 2 TIMES DAILY WITH MEALS    cetirizine (ZYRTEC) 10 mg, Oral, DAILY    Cholecalciferol

## 2024-02-09 ENCOUNTER — OFFICE VISIT (OUTPATIENT)
Dept: PAIN MANAGEMENT | Age: 54
End: 2024-02-09
Payer: COMMERCIAL

## 2024-02-09 VITALS — HEIGHT: 69 IN | BODY MASS INDEX: 46.65 KG/M2 | WEIGHT: 315 LBS

## 2024-02-09 DIAGNOSIS — M53.3 SACROILIAC JOINT PAIN: Primary | ICD-10-CM

## 2024-02-09 DIAGNOSIS — M54.50 CHRONIC BILATERAL LOW BACK PAIN WITHOUT SCIATICA: ICD-10-CM

## 2024-02-09 DIAGNOSIS — Z79.02 ENCOUNTER FOR CURRENT LONG TERM USE OF ANTIPLATELET DRUG: ICD-10-CM

## 2024-02-09 DIAGNOSIS — M54.12 CERVICAL RADICULOPATHY: ICD-10-CM

## 2024-02-09 DIAGNOSIS — M47.812 CERVICAL SPONDYLOSIS: ICD-10-CM

## 2024-02-09 DIAGNOSIS — G89.29 CHRONIC BILATERAL LOW BACK PAIN WITHOUT SCIATICA: ICD-10-CM

## 2024-02-09 DIAGNOSIS — Z79.01 LONG TERM (CURRENT) USE OF ANTICOAGULANTS: ICD-10-CM

## 2024-02-09 DIAGNOSIS — M50.30 DEGENERATIVE DISC DISEASE, CERVICAL: ICD-10-CM

## 2024-02-09 DIAGNOSIS — E66.01 CLASS 3 SEVERE OBESITY WITH BODY MASS INDEX (BMI) OF 50.0 TO 59.9 IN ADULT, UNSPECIFIED OBESITY TYPE, UNSPECIFIED WHETHER SERIOUS COMORBIDITY PRESENT (HCC): ICD-10-CM

## 2024-02-09 PROCEDURE — 99214 OFFICE O/P EST MOD 30 MIN: CPT | Performed by: STUDENT IN AN ORGANIZED HEALTH CARE EDUCATION/TRAINING PROGRAM

## 2024-02-09 RX ORDER — PROCHLORPERAZINE 25 MG/1
SUPPOSITORY RECTAL
COMMUNITY
Start: 2024-01-31

## 2024-02-09 RX ORDER — CITALOPRAM 40 MG/1
1 TABLET ORAL DAILY
COMMUNITY

## 2024-02-09 RX ORDER — PROCHLORPERAZINE 25 MG/1
SUPPOSITORY RECTAL
COMMUNITY
Start: 2023-12-08

## 2024-02-09 NOTE — PROGRESS NOTES
Chronic Pain Clinic Note     Encounter Date: 2/9/2024     SUBJECTIVE:  Chief Complaint   Patient presents with    Neck Pain    Follow Up After Procedure     Cervical Interlaminar Epidural Steroid Injection using Fluoroscopy       History of Present Illness:   Arben Chow is a 53 y.o. male who presents with neck & back pain    Medication Refill: n/a    Current Complaints of Pain:   Location: back & neck  Radiation: both arms, both legs, Left is worse  Severity:  Moderate  Pain Numerical Score - 0 today for neck and arm, 5 for low back  Average:  5/6     Highest: 8/9  Lowest:  2  Character/Quality: Complains of pain that is burning  Timing: Constant  Associated symptoms: none  Numbness: Left leg  Weakness: arms  Exacerbating factors:  standing to long, walking to long  Alleviating factors:  laying down   Length of time pain has been present: Started about Feb/March 2023  Inciting event/injury: no  Bowel/Bladder incontinence: no  Falls: no  Physical Therapy: no    History of Interventions:   Surgery: 1 lumbar  Injections: None    Imaging:    MRI Cervical 05/31/2023    Findings:     Preserved cervical vertebral body heights.  Straightening of the typical cervical lordosis.  No suspicious bone marrow replacing process.  No significant bone marrow edema.     Cord visualized from the brainstem through the upper thoracic spine.  No cervical cord compression, morphologic distortion or cord signal abnormality.  Subtle visualization of right para midline pontine STIR hyperintensity with apparent sparing the basis pontis [or involvement of the posterior   aspect of this structure, series 5 image #8].     Multilevel discogenic disease, representative levels detailed below:     C2-C3: Posterior disc osteophyte complex, eccentric to the left.  Subtle flattening left ventral cord [series 6 image #5].  Mild focal thecal sac narrowing.  Minimal left neural foraminal narrowing.  No significant right neural foraminal narrowing.

## 2024-02-09 NOTE — H&P (VIEW-ONLY)
dyspnea, cough, wheezing  Gastrointestinal: negative for constipation, diarrhea, nausea  Genitourinary: negative for bowel/bladder incontinence   Musculoskeletal: positive for low back pain  Neurological: negative for radicular leg pain, leg weakness or numbness/tingling  Behavioral/Psych: negative for anxiety/depression   Hematological: negative for abnormal bleeding, anticoagulation use or antiplatelet use  All other systems reviewed and are negative    OBJECTIVE:    There were no vitals filed for this visit.    PHYSICAL EXAM    GENERAL: No acute distress, pleasant, well-appearing  HEENT: Normocephalic, atraumatic, Pupils equal and round  CARDIOVASCULAR: Well perfused, No peripheral cyanosis  PULMONARY: Good chest wall excursion, breathing unlabored  PSYCH: Appropriate affect and insight, non-pressured speech  SKIN: No rashes or lesions  MUSCULOSKELETAL:  Inspection: The back and extremities are symmetric and aligned. Muscle bulk is normal in appearance.  Palpation: There is tenderness to palpation along the cervical and lumbar paraspinal musculature bilaterally  Cervical range of motion is full    Sacroiliac joint exam:  Tenderness to palpation along the PSIS bilaterally  Positive PÉREZ bilaterally  Positive SI joint compression test bilaterally  Positive thigh thrust bilaterally     NEUROMUSCULAR:  Patient ambulates unassisted  Gait is nonantalgic  Sensation to light touch is grossly intact in upper extremities  Strength is grossly intact in upper extremities  No ankle clonus  Negative Kumar's sign    Special Tests:  Cervical facet loading is positive bilaterally  Cervical Spurling's test is positive on right    DIAGNOSIS:    ICD-10-CM    1. Sacroiliac joint pain  M53.3 SI JOINT INJECTIONS / SI NERVE BLOCK     CANCELED: SI JOINT INJECTIONS / SI NERVE BLOCK      2. Chronic bilateral low back pain without sciatica  M54.50     G89.29       3. Class 3 severe obesity with body mass index (BMI) of 50.0 to 59.9 in

## 2024-02-27 ENCOUNTER — HOSPITAL ENCOUNTER (OUTPATIENT)
Dept: PAIN MANAGEMENT | Facility: CLINIC | Age: 54
Discharge: HOME | End: 2024-02-27
Payer: COMMERCIAL

## 2024-02-27 VITALS
DIASTOLIC BLOOD PRESSURE: 66 MMHG | HEART RATE: 101 BPM | OXYGEN SATURATION: 99 % | RESPIRATION RATE: 18 BRPM | BODY MASS INDEX: 46.65 KG/M2 | WEIGHT: 315 LBS | HEIGHT: 69 IN | SYSTOLIC BLOOD PRESSURE: 136 MMHG | TEMPERATURE: 97.1 F

## 2024-02-27 DIAGNOSIS — R52 PAIN MANAGEMENT: ICD-10-CM

## 2024-02-27 PROCEDURE — 2500000003 HC RX 250 WO HCPCS: Performed by: STUDENT IN AN ORGANIZED HEALTH CARE EDUCATION/TRAINING PROGRAM

## 2024-02-27 PROCEDURE — G0260 INJ FOR SACROILIAC JT ANESTH: HCPCS

## 2024-02-27 PROCEDURE — 27096 INJECT SACROILIAC JOINT: CPT | Performed by: STUDENT IN AN ORGANIZED HEALTH CARE EDUCATION/TRAINING PROGRAM

## 2024-02-27 RX ORDER — LIDOCAINE HYDROCHLORIDE 20 MG/ML
INJECTION, SOLUTION EPIDURAL; INFILTRATION; INTRACAUDAL; PERINEURAL
Status: COMPLETED | OUTPATIENT
Start: 2024-02-27 | End: 2024-02-27

## 2024-02-27 RX ADMIN — LIDOCAINE HYDROCHLORIDE 2 ML: 20 INJECTION, SOLUTION EPIDURAL; INFILTRATION; INTRACAUDAL; PERINEURAL at 08:34

## 2024-02-27 ASSESSMENT — PAIN DESCRIPTION - DESCRIPTORS: DESCRIPTORS: SHARP;STABBING

## 2024-02-27 ASSESSMENT — PAIN - FUNCTIONAL ASSESSMENT
PAIN_FUNCTIONAL_ASSESSMENT: 0-10
PAIN_FUNCTIONAL_ASSESSMENT: PREVENTS OR INTERFERES WITH ALL ACTIVE AND SOME PASSIVE ACTIVITIES
PAIN_FUNCTIONAL_ASSESSMENT: 0-10

## 2024-02-27 NOTE — OP NOTE
PROCEDURE PERFORMED: Bilateral Sacroiliac joint injection    PREOPERATIVE DIAGNOSIS: Lumbago and sacroiliac joint pain    INDICATIONS: Chronic low back pain    The patient's history and physical exam were reviewed.  The risk, benefits, and alternatives of the procedure were discussed and all questions were answered to the patient's satisfaction.  The patient agreed to proceed and written informed consent was obtained.    POSTOPERATIVE DIAGNOSIS: Same    PHYSICIAN:  Dr. Kale Correa DO    ANESTHESIA:  LOCAL    ASSISTANT:  NONE    PATHOLOGY:  NONE    ESTIMATED BLOOD LOSS:  N/A    IMPLANTS:  NONE    PROCEDURE DESCRIPTION: Bilateral diagnostic sacroiliac joint injection using fluoroscopy    The patient was placed on the operative bed in prone position.  The area was prepped with  Chlorhexidine.  The area was then draped in a sterile fashion.  The targeted side of the sacroiliac joint was identified and marked under AP fluoroscopy.  The fluoroscopic beam was then obliqued until the anterior and posterior margins of the joint were aligned.  The inferior margin of the joint was identified.  A 25-gauge 3-1/2 inch Quincke needle was directed toward the identified point under fluoroscopic guidance.  The joint space was then entered and negative aspiration was confirmed.  Then, Omnipaque was injected.  An appropriate arthrogram was noted.  Then, after negative aspiration, the injectate was easily injected.  The injectate consisted of 1 mL lidocaine 2%.  The needle was then removed. The same procedure was performed on the opposite side. The needle site was dressed appropriately.    The patient was transferred to the postoperative care unit in stable condition.  Written discharge instructions were given to the patient.    COMPLICATIONS:  There were no apparent complications.  The patient tolerated the procedure well.

## 2024-02-27 NOTE — DISCHARGE INSTRUCTIONS

## 2024-02-27 NOTE — INTERVAL H&P NOTE
Update History & Physical    The patient's History and Physical of February 9, 2024 was reviewed with the patient and I examined the patient. There was no change. The surgical site was confirmed by the patient and me.     Plan: The risks, benefits, expected outcome, and alternative to the recommended procedure have been discussed with the patient. Patient understands and wants to proceed with the procedure.     ASA CLASSIFICATION  3  MP   CLASSIFICATION  3    Electronically signed by Kale Correa DO on 2/27/2024 at 8:23 AM

## 2024-03-18 ENCOUNTER — TELEPHONE (OUTPATIENT)
Dept: PAIN MANAGEMENT | Age: 54
End: 2024-03-18

## 2024-03-18 NOTE — TELEPHONE ENCOUNTER
Patient was experiencing dizziness and went to ER for precaution. CT completed. No medications given. Patient called wanting to make sure it was ok to continue with scheduled procedure on 03/19/24. Please advise.

## 2024-03-19 ENCOUNTER — HOSPITAL ENCOUNTER (OUTPATIENT)
Dept: PAIN MANAGEMENT | Facility: CLINIC | Age: 54
Discharge: HOME | End: 2024-03-19
Payer: COMMERCIAL

## 2024-03-19 VITALS
OXYGEN SATURATION: 95 % | RESPIRATION RATE: 12 BRPM | WEIGHT: 315 LBS | HEIGHT: 69 IN | BODY MASS INDEX: 46.65 KG/M2 | DIASTOLIC BLOOD PRESSURE: 64 MMHG | TEMPERATURE: 97.9 F | SYSTOLIC BLOOD PRESSURE: 135 MMHG | HEART RATE: 85 BPM

## 2024-03-19 DIAGNOSIS — R52 PAIN MANAGEMENT: ICD-10-CM

## 2024-03-19 PROCEDURE — G0260 INJ FOR SACROILIAC JT ANESTH: HCPCS

## 2024-03-19 PROCEDURE — 2500000003 HC RX 250 WO HCPCS: Performed by: STUDENT IN AN ORGANIZED HEALTH CARE EDUCATION/TRAINING PROGRAM

## 2024-03-19 PROCEDURE — 27096 INJECT SACROILIAC JOINT: CPT | Performed by: STUDENT IN AN ORGANIZED HEALTH CARE EDUCATION/TRAINING PROGRAM

## 2024-03-19 RX ORDER — LIDOCAINE HYDROCHLORIDE 10 MG/ML
INJECTION, SOLUTION EPIDURAL; INFILTRATION; INTRACAUDAL; PERINEURAL
Status: COMPLETED | OUTPATIENT
Start: 2024-03-19 | End: 2024-03-19

## 2024-03-19 RX ADMIN — LIDOCAINE HYDROCHLORIDE 4 ML: 10 INJECTION, SOLUTION EPIDURAL; INFILTRATION; INTRACAUDAL at 09:19

## 2024-03-19 ASSESSMENT — PAIN - FUNCTIONAL ASSESSMENT
PAIN_FUNCTIONAL_ASSESSMENT: PREVENTS OR INTERFERES WITH ALL ACTIVE AND SOME PASSIVE ACTIVITIES
PAIN_FUNCTIONAL_ASSESSMENT: 0-10

## 2024-03-19 ASSESSMENT — PAIN DESCRIPTION - DESCRIPTORS: DESCRIPTORS: SHARP;STABBING

## 2024-03-19 ASSESSMENT — PAIN DESCRIPTION - LOCATION: LOCATION: HIP

## 2024-03-19 ASSESSMENT — PAIN SCALES - GENERAL: PAINLEVEL_OUTOF10: 3

## 2024-03-19 NOTE — H&P
Pain Pre-Op H&P Note    SUBJECTIVE:  No chief complaint on file.      History of Present Illness:   Arben Chow is a 53 y.o. male who presents with chronic low back pain.    Past Medical History:   Diagnosis Date    Chronic pain     Depression     Diabetes mellitus (HCC)     Edema     GERD (gastroesophageal reflux disease)     Hyperlipidemia     Hypertension     Kidney calculi     Vertigo        Past Surgical History:   Procedure Laterality Date    KNEE ARTHROSCOPY Right     KNEE ARTHROSCOPY Left     NERVE SURGERY      SPINAL FUSION      TONSILLECTOMY      TUMOR REMOVAL      left leg       History reviewed. No pertinent family history.    Social History     Socioeconomic History    Marital status:      Spouse name: Not on file    Number of children: Not on file    Years of education: Not on file    Highest education level: Not on file   Occupational History    Not on file   Tobacco Use    Smoking status: Never    Smokeless tobacco: Not on file   Substance and Sexual Activity    Alcohol use: Yes     Comment: Socially    Drug use: No    Sexual activity: Not on file   Other Topics Concern    Not on file   Social History Narrative    Not on file     Social Determinants of Health     Financial Resource Strain: Not on file   Food Insecurity: Not on file   Transportation Needs: Not on file   Physical Activity: Not on file   Stress: Not on file   Social Connections: Not on file   Intimate Partner Violence: Not on file   Housing Stability: Not on file       Medications & Allergies:   Current Outpatient Medications   Medication Instructions    acitretin (SORIATANE) 25 mg, 2 TIMES DAILY    amLODIPine (NORVASC) 2.5 mg, Oral, NIGHTLY    apixaban (ELIQUIS) 5 MG TABS tablet Oral, 2 TIMES DAILY    aspirin 81 mg, DAILY    atorvastatin (LIPITOR) 80 mg, Oral, NIGHTLY    bumetanide (BUMEX) 2 mg, Oral, 2 TIMES DAILY    carvedilol (COREG) 12.5 mg, Oral, 2 TIMES DAILY WITH MEALS    cetirizine (ZYRTEC) 10 mg, Oral, DAILY

## 2024-04-12 ENCOUNTER — OFFICE VISIT (OUTPATIENT)
Dept: PAIN MANAGEMENT | Age: 54
End: 2024-04-12
Payer: COMMERCIAL

## 2024-04-12 VITALS — WEIGHT: 315 LBS | BODY MASS INDEX: 46.65 KG/M2 | HEIGHT: 69 IN

## 2024-04-12 DIAGNOSIS — Z79.01 LONG TERM (CURRENT) USE OF ANTICOAGULANTS: ICD-10-CM

## 2024-04-12 DIAGNOSIS — E66.01 CLASS 3 SEVERE OBESITY WITH BODY MASS INDEX (BMI) OF 50.0 TO 59.9 IN ADULT, UNSPECIFIED OBESITY TYPE, UNSPECIFIED WHETHER SERIOUS COMORBIDITY PRESENT (HCC): ICD-10-CM

## 2024-04-12 DIAGNOSIS — M54.12 CERVICAL RADICULOPATHY: ICD-10-CM

## 2024-04-12 DIAGNOSIS — M53.3 SACROILIAC JOINT PAIN: Primary | ICD-10-CM

## 2024-04-12 DIAGNOSIS — M47.812 CERVICAL SPONDYLOSIS: ICD-10-CM

## 2024-04-12 DIAGNOSIS — M50.30 DEGENERATIVE DISC DISEASE, CERVICAL: ICD-10-CM

## 2024-04-12 DIAGNOSIS — M96.1 LUMBAR POST-LAMINECTOMY SYNDROME: ICD-10-CM

## 2024-04-12 DIAGNOSIS — Z79.02 ENCOUNTER FOR CURRENT LONG TERM USE OF ANTIPLATELET DRUG: ICD-10-CM

## 2024-04-12 DIAGNOSIS — M54.50 CHRONIC BILATERAL LOW BACK PAIN WITHOUT SCIATICA: ICD-10-CM

## 2024-04-12 DIAGNOSIS — G89.29 CHRONIC BILATERAL LOW BACK PAIN WITHOUT SCIATICA: ICD-10-CM

## 2024-04-12 PROCEDURE — 99213 OFFICE O/P EST LOW 20 MIN: CPT | Performed by: STUDENT IN AN ORGANIZED HEALTH CARE EDUCATION/TRAINING PROGRAM

## 2024-04-12 NOTE — PROGRESS NOTES
diagnostic bilateral sacroiliac joint injections on 2/27/2024 and 3/19/2024 with 100% improvement in pain and function  -Status post cervical C7-T1 interlaminar epidural steroid injection on 1/2/2024 with 100% improvement in pain and function  -Of note, patient on Plavix and Eliquis  -Diabetic    Imaging:  -No new imaging    Behavioral Therapies:  -Continue daily stretching and home exercise program    Referrals:  -Orthopedic surgery to discuss SI joint fusion    Follow-up Plan:  -3 months    Patient was offered intervention where appropriate.     Multi-modal Pain Therapy:  The patient was explicitly considered for multimodal and interdisciplinary therapy. Non-opioid and non-pharmacological opportunities to enhance analgesia and quality of life have been and will continue to be pursued.    Kale Correa,   Interventional Pain Management/PM&R   [facility]    No orders of the defined types were placed in this encounter.

## 2024-11-22 ENCOUNTER — OFFICE VISIT (OUTPATIENT)
Dept: PAIN MANAGEMENT | Age: 54
End: 2024-11-22
Payer: COMMERCIAL

## 2024-11-22 VITALS — WEIGHT: 315 LBS | BODY MASS INDEX: 47.74 KG/M2 | HEIGHT: 68 IN

## 2024-11-22 DIAGNOSIS — Z79.02 ENCOUNTER FOR CURRENT LONG TERM USE OF ANTIPLATELET DRUG: ICD-10-CM

## 2024-11-22 DIAGNOSIS — M96.1 LUMBAR POST-LAMINECTOMY SYNDROME: ICD-10-CM

## 2024-11-22 DIAGNOSIS — E66.813 CLASS 3 SEVERE OBESITY WITH BODY MASS INDEX (BMI) OF 50.0 TO 59.9 IN ADULT, UNSPECIFIED OBESITY TYPE, UNSPECIFIED WHETHER SERIOUS COMORBIDITY PRESENT: ICD-10-CM

## 2024-11-22 DIAGNOSIS — M53.3 SACROILIAC JOINT PAIN: ICD-10-CM

## 2024-11-22 DIAGNOSIS — M47.812 CERVICAL SPONDYLOSIS: ICD-10-CM

## 2024-11-22 DIAGNOSIS — M54.12 CERVICAL RADICULOPATHY: Primary | ICD-10-CM

## 2024-11-22 DIAGNOSIS — E66.01 CLASS 3 SEVERE OBESITY WITH BODY MASS INDEX (BMI) OF 50.0 TO 59.9 IN ADULT, UNSPECIFIED OBESITY TYPE, UNSPECIFIED WHETHER SERIOUS COMORBIDITY PRESENT: ICD-10-CM

## 2024-11-22 DIAGNOSIS — M50.30 DEGENERATIVE DISC DISEASE, CERVICAL: ICD-10-CM

## 2024-11-22 PROCEDURE — 99214 OFFICE O/P EST MOD 30 MIN: CPT | Performed by: STUDENT IN AN ORGANIZED HEALTH CARE EDUCATION/TRAINING PROGRAM

## 2024-11-22 RX ORDER — METHOCARBAMOL 500 MG/1
500 TABLET, FILM COATED ORAL
Qty: 120 TABLET | Refills: 0 | Status: SHIPPED | OUTPATIENT
Start: 2024-11-22 | End: 2024-12-22

## 2024-11-22 NOTE — PROGRESS NOTES
Chronic Pain Clinic Note     Encounter Date: 11/22/2024     SUBJECTIVE:  Chief Complaint   Patient presents with    Back Pain       History of Present Illness:   Arben Chow is a 54 y.o. male who presents with neck & back pain    Medication Refill: n/a    Current Complaints of Pain:   Location: back & neck  Radiation: both arms, both legs, Left is worse  Severity:  Moderate  Pain Numerical Score - 4 today   Average:  5/6     Highest: 8/9  Lowest:  2  Character/Quality: Complains of pain that is numbness, tingling  Timing: Constant  Associated symptoms: none  Numbness: Left leg  Weakness: arms  Exacerbating factors:  standing to long, walking to long  Alleviating factors:  laying down   Length of time pain has been present: Started about Feb/March 2023  Inciting event/injury: no  Bowel/Bladder incontinence: no  Falls: none in the past month   Physical Therapy: no    History of Interventions:   Surgery: 1 lumbar  Injections: BAMBI & SIJ    Imaging:    MRI Cervical 11/08/2024    Lumbar MRI 5/31/2023        Past Medical History:   Diagnosis Date    Chronic pain     Depression     Diabetes mellitus (HCC)     Edema     GERD (gastroesophageal reflux disease)     Hyperlipidemia     Hypertension     Kidney calculi     Vertigo        Past Surgical History:   Procedure Laterality Date    KNEE ARTHROSCOPY Right     KNEE ARTHROSCOPY Left     NERVE SURGERY      SPINAL FUSION      TONSILLECTOMY      TUMOR REMOVAL      left leg       History reviewed. No pertinent family history.    Social History     Socioeconomic History    Marital status:      Spouse name: Not on file    Number of children: Not on file    Years of education: Not on file    Highest education level: Not on file   Occupational History    Not on file   Tobacco Use    Smoking status: Never    Smokeless tobacco: Not on file   Substance and Sexual Activity    Alcohol use: Yes     Comment: Socially    Drug use: No    Sexual activity: Not on file   Other

## 2024-12-19 DIAGNOSIS — M54.12 CERVICAL RADICULOPATHY: ICD-10-CM

## 2024-12-20 RX ORDER — METHOCARBAMOL 500 MG/1
TABLET, FILM COATED ORAL
Qty: 120 TABLET | Refills: 0 | Status: SHIPPED | OUTPATIENT
Start: 2024-12-20

## 2025-01-07 ENCOUNTER — HOSPITAL ENCOUNTER (OUTPATIENT)
Dept: PAIN MANAGEMENT | Facility: CLINIC | Age: 55
Discharge: HOME | End: 2025-01-07
Payer: COMMERCIAL

## 2025-01-07 VITALS
HEIGHT: 69 IN | SYSTOLIC BLOOD PRESSURE: 128 MMHG | TEMPERATURE: 98.3 F | HEART RATE: 87 BPM | BODY MASS INDEX: 46.65 KG/M2 | OXYGEN SATURATION: 96 % | WEIGHT: 315 LBS | RESPIRATION RATE: 14 BRPM | DIASTOLIC BLOOD PRESSURE: 63 MMHG

## 2025-01-07 DIAGNOSIS — R52 PAIN MANAGEMENT: ICD-10-CM

## 2025-01-07 LAB — GLUCOSE BLD-MCNC: 102 MG/DL (ref 75–110)

## 2025-01-07 PROCEDURE — 82947 ASSAY GLUCOSE BLOOD QUANT: CPT

## 2025-01-07 PROCEDURE — 62321 NJX INTERLAMINAR CRV/THRC: CPT

## 2025-01-07 PROCEDURE — 6360000002 HC RX W HCPCS: Performed by: STUDENT IN AN ORGANIZED HEALTH CARE EDUCATION/TRAINING PROGRAM

## 2025-01-07 PROCEDURE — 6360000004 HC RX CONTRAST MEDICATION: Performed by: STUDENT IN AN ORGANIZED HEALTH CARE EDUCATION/TRAINING PROGRAM

## 2025-01-07 PROCEDURE — 62321 NJX INTERLAMINAR CRV/THRC: CPT | Performed by: STUDENT IN AN ORGANIZED HEALTH CARE EDUCATION/TRAINING PROGRAM

## 2025-01-07 RX ORDER — LIDOCAINE HYDROCHLORIDE 10 MG/ML
INJECTION, SOLUTION EPIDURAL; INFILTRATION; INTRACAUDAL; PERINEURAL
Status: COMPLETED | OUTPATIENT
Start: 2025-01-07 | End: 2025-01-07

## 2025-01-07 RX ORDER — DEXAMETHASONE SODIUM PHOSPHATE 10 MG/ML
INJECTION, SOLUTION INTRAMUSCULAR; INTRAVENOUS
Status: COMPLETED | OUTPATIENT
Start: 2025-01-07 | End: 2025-01-07

## 2025-01-07 RX ADMIN — DEXAMETHASONE SODIUM PHOSPHATE 20 MG: 10 INJECTION, SOLUTION INTRAMUSCULAR; INTRAVENOUS at 10:17

## 2025-01-07 RX ADMIN — LIDOCAINE HYDROCHLORIDE 2 ML: 10 INJECTION, SOLUTION EPIDURAL; INFILTRATION; INTRACAUDAL at 10:17

## 2025-01-07 RX ADMIN — LIDOCAINE HYDROCHLORIDE 2 ML: 10 INJECTION, SOLUTION EPIDURAL; INFILTRATION; INTRACAUDAL at 10:14

## 2025-01-07 RX ADMIN — IOHEXOL 3 ML: 180 INJECTION INTRAVENOUS at 10:16

## 2025-01-07 RX ADMIN — LIDOCAINE HYDROCHLORIDE 1 ML: 10 INJECTION, SOLUTION EPIDURAL; INFILTRATION; INTRACAUDAL at 10:17

## 2025-01-07 ASSESSMENT — PAIN SCALES - GENERAL: PAINLEVEL_OUTOF10: 5

## 2025-01-07 ASSESSMENT — PAIN DESCRIPTION - LOCATION: LOCATION: NECK

## 2025-01-07 ASSESSMENT — PAIN DESCRIPTION - DESCRIPTORS: DESCRIPTORS: SHARP;SHOOTING;TINGLING

## 2025-01-07 ASSESSMENT — PAIN - FUNCTIONAL ASSESSMENT: PAIN_FUNCTIONAL_ASSESSMENT: 0-10

## 2025-01-07 NOTE — H&P
review of systems was performed, and negative as pertinent to diagnosis, except as stated in HPI.      Physical Exam  Constitutional:       Appearance: Normal appearance.   Pulmonary:      Effort: Pulmonary effort is normal.   Neurological:      Mental Status: alert.   Psychiatric:         Attention and Perception: Attention and perception normal.         Mood and Affect: Mood and affect normal.   Cardiovascular:      Rate: Normal rate.       Patient's current physical status, medications, medical history, and HPI have been reviewed and updated as appropriate on this date: 01/07/25    Risk/Benefit(s): The risks, benefits, alternatives, and potential complications have been discussed with the patient/family and informed consent has been obtained for the procedure/sedation.    Diagnosis:   Cervical radiculopathy      Plan: Cervical C7-T1 interlaminar epidural steroid injection      ASA CLASSIFICATION  2  MP   CLASSIFICATION  2    Kale Correa DO     Agree with above assessment and plan as outlined above.    - abnormal Stress and CP  - Admit for cath  - Further plan pending cath    León Gibson MD, FACC

## 2025-01-07 NOTE — DISCHARGE INSTRUCTIONS

## 2025-01-08 ENCOUNTER — TRANSCRIBE ORDERS (OUTPATIENT)
Dept: ADMINISTRATIVE | Age: 55
End: 2025-01-08

## 2025-01-08 DIAGNOSIS — G56.02 LEFT CARPAL TUNNEL SYNDROME: Primary | ICD-10-CM

## 2025-01-29 ENCOUNTER — TELEPHONE (OUTPATIENT)
Dept: PAIN MANAGEMENT | Age: 55
End: 2025-01-29

## 2025-01-29 NOTE — TELEPHONE ENCOUNTER
Pt has a VV with Ofe on 1/31/25.  NP would like to switch him to 1/30/25, still as a VV.  Would also like to move the appointment time to an earlier time if able. VM left for pt to call back.

## 2025-01-30 ENCOUNTER — TELEMEDICINE (OUTPATIENT)
Dept: PAIN MANAGEMENT | Age: 55
End: 2025-01-30
Payer: COMMERCIAL

## 2025-01-30 DIAGNOSIS — M54.12 CERVICAL RADICULOPATHY: Primary | ICD-10-CM

## 2025-01-30 DIAGNOSIS — M50.30 DEGENERATIVE DISC DISEASE, CERVICAL: ICD-10-CM

## 2025-01-30 DIAGNOSIS — M47.812 CERVICAL SPONDYLOSIS: ICD-10-CM

## 2025-01-30 PROCEDURE — 99212 OFFICE O/P EST SF 10 MIN: CPT | Performed by: NURSE PRACTITIONER

## 2025-01-30 ASSESSMENT — ENCOUNTER SYMPTOMS
CONSTIPATION: 0
SHORTNESS OF BREATH: 0
COUGH: 0

## 2025-01-30 NOTE — PROGRESS NOTES
System, [facility]    Social Connection and Isolation Panel [NHANES]     Frequency of Communication with Friends and Family: More than three times a week     Frequency of Social Gatherings with Friends and Family: Once a week     Attends Congregational Services: Never     Active Member of Clubs or Organizations: No     Attends Club or Organization Meetings: Patient declined     Marital Status:    Intimate Partner Violence: Unknown (2/22/2024)    Received from [facility], [facility]    UT Safety & Environment     Fear of Current or Ex-Partner: Not on file     Emotionally Abused: Not on file     Physically Abused: Not on file     Sexually Abused: Not on file     Physically or Sexually Abused: Not on file   Housing Stability: Low Risk  (11/15/2024)    Received from [facility]    Housing Instability     Are you worried or concerned that in the next two months you may not have stable housing that you own, rent or stay in as a part of a household?: No       Review of Systems:  Review of Systems   Constitutional: Negative for chills and fever.   Cardiovascular:  Negative for chest pain and palpitations.   Respiratory:  Negative for cough and shortness of breath.    Musculoskeletal:  Positive for neck pain.   Gastrointestinal:  Negative for constipation.   Neurological:  Positive for numbness, tingling and weakness. Negative for disturbances in coordination, headaches and loss of balance.       Physical Exam:  There were no vitals taken for this visit.    Physical Exam  HENT:      Head: Normocephalic.   Pulmonary:      Effort: Pulmonary effort is normal.   Neurological:      Mental Status: He is alert.       Record/Diagnostics Review:    Findings:     Stable old brainstem lacunar infarct with gliosis at the basis pontis     C2-3 shows posterior disc osteophyte complex with mild central canal stenosis and mild right-sided foraminal narrowing similar prior study     C3-4

## 2025-03-03 DIAGNOSIS — M54.12 CERVICAL RADICULOPATHY: ICD-10-CM

## 2025-03-03 RX ORDER — METHOCARBAMOL 500 MG/1
TABLET, FILM COATED ORAL
Qty: 120 TABLET | Refills: 0 | OUTPATIENT
Start: 2025-03-03

## 2025-06-24 RX ORDER — SODIUM CHLORIDE, SODIUM LACTATE, POTASSIUM CHLORIDE, CALCIUM CHLORIDE 600; 310; 30; 20 MG/100ML; MG/100ML; MG/100ML; MG/100ML
INJECTION, SOLUTION INTRAVENOUS CONTINUOUS
OUTPATIENT
Start: 2025-06-24

## 2025-06-24 NOTE — DISCHARGE INSTRUCTIONS
Preoperative Instructions:    Stop eating solid foods at midnight the night prior to surgery.    Stop drinking clear liquids at midnight the night prior to surgery.    Arrive at the surgery center (Entrance B) by 5:45-6:00 on 7/28/2025  (or as directed by your surgeon's office).    Please stop any blood thinning medications as directed by your surgeon or prescribing physician. Failure to stop certain medications may interfere with your scheduled surgery.    These may include:  Aspirin, Warfarin (Coumadin), Clopidogrel (Plavix), Ibuprofen (Motrin, Advil), Naproxen (Aleve), Meloxicam (Mobic), Celecoxib (Celebrex), Eliquis, Pradaxa, Xarelto, Effient, Fish Oil, Herbal supplements.    Ozempic - no injection within 7 days of surgery date    Hold plavix up to 7 days prior to surgery, according to your cardiologist.     Hold losartan/cozaar 24 hours prior to surgery.    You may continue the rest of your medications through the night before surgery unless instructed otherwise.    Please take only the following medication(s) the day of surgery with a small sip of water:  cardizem/diltiazem, imdur/isosorbide, ranexa    Please use and bring inhalers the day of surgery, if applies.  Please bring CPAP the day of surgery, if applies.    PLEASE NOTE:  THE ABOVE (IF ANY) DISCONTINUED MEDS MAY ONLY BE FROM   \"CLEANING UP\" THE MED LIST AND WERE NOT ACTUALLY CANCELLED; SEE CHART FOR DETAILS AND ALWAYS CHECK WITH PRESCRIBING PROVIDER BEFORE DISCONTINUING ANY MEDICATIONS        REMINDERS:  ** If you are going home the day of your procedure, you will need a friend or family member to drive you home after your procedure.  Your  must be 18 years of age or older and able to sign off on your discharge instructions.  Taxi cabs or any form of public transportation is not acceptable.    ** It is preferable that the friend or family member stay at the hospital throughout your procedure.  ** If you are going home the same day as your

## 2025-07-01 ENCOUNTER — HOSPITAL ENCOUNTER (OUTPATIENT)
Dept: PREADMISSION TESTING | Age: 55
Discharge: HOME | End: 2025-07-05
Payer: COMMERCIAL

## 2025-07-01 VITALS
OXYGEN SATURATION: 97 % | HEIGHT: 68 IN | SYSTOLIC BLOOD PRESSURE: 107 MMHG | DIASTOLIC BLOOD PRESSURE: 58 MMHG | WEIGHT: 315 LBS | RESPIRATION RATE: 24 BRPM | TEMPERATURE: 98.2 F | BODY MASS INDEX: 47.74 KG/M2 | HEART RATE: 87 BPM

## 2025-07-01 LAB
ANION GAP SERPL CALCULATED.3IONS-SCNC: 13 MMOL/L (ref 9–16)
BUN SERPL-MCNC: 7 MG/DL (ref 6–20)
CALCIUM SERPL-MCNC: 9.3 MG/DL (ref 8.6–10.4)
CHLORIDE SERPL-SCNC: 99 MMOL/L (ref 98–107)
CO2 SERPL-SCNC: 24 MMOL/L (ref 20–31)
CREAT SERPL-MCNC: 0.9 MG/DL (ref 0.7–1.2)
ERYTHROCYTE [DISTWIDTH] IN BLOOD BY AUTOMATED COUNT: 14.6 % (ref 11.8–14.4)
GFR, ESTIMATED: >90 ML/MIN/1.73M2
GLUCOSE SERPL-MCNC: 169 MG/DL (ref 74–99)
HCT VFR BLD AUTO: 45.6 % (ref 40.7–50.3)
HGB BLD-MCNC: 15.1 G/DL (ref 13–17)
MCH RBC QN AUTO: 30.8 PG (ref 25.2–33.5)
MCHC RBC AUTO-ENTMCNC: 33.1 G/DL (ref 28.4–34.8)
MCV RBC AUTO: 93.1 FL (ref 82.6–102.9)
NRBC BLD-RTO: 0 PER 100 WBC
PLATELET # BLD AUTO: 220 K/UL (ref 138–453)
PMV BLD AUTO: 10.3 FL (ref 8.1–13.5)
POTASSIUM SERPL-SCNC: 4.2 MMOL/L (ref 3.7–5.3)
RBC # BLD AUTO: 4.9 M/UL (ref 4.21–5.77)
SODIUM SERPL-SCNC: 136 MMOL/L (ref 136–145)
WBC OTHER # BLD: 8.7 K/UL (ref 3.5–11.3)

## 2025-07-01 PROCEDURE — 85027 COMPLETE CBC AUTOMATED: CPT

## 2025-07-01 PROCEDURE — 80048 BASIC METABOLIC PNL TOTAL CA: CPT

## 2025-07-01 PROCEDURE — 36415 COLL VENOUS BLD VENIPUNCTURE: CPT

## 2025-07-01 PROCEDURE — 93005 ELECTROCARDIOGRAM TRACING: CPT | Performed by: ANESTHESIOLOGY

## 2025-07-01 RX ORDER — RANOLAZINE 500 MG/1
500 TABLET, EXTENDED RELEASE ORAL 2 TIMES DAILY
COMMUNITY

## 2025-07-01 RX ORDER — LOSARTAN POTASSIUM 50 MG/1
50 TABLET ORAL DAILY
COMMUNITY

## 2025-07-01 RX ORDER — NITROGLYCERIN 0.4 MG/1
0.4 TABLET SUBLINGUAL EVERY 5 MIN PRN
COMMUNITY

## 2025-07-01 RX ORDER — SPIRONOLACTONE 25 MG/1
25 TABLET ORAL DAILY
COMMUNITY
Start: 2025-03-18

## 2025-07-01 RX ORDER — CARVEDILOL 25 MG/1
25 TABLET ORAL 2 TIMES DAILY WITH MEALS
COMMUNITY

## 2025-07-01 RX ORDER — OLOPATADINE HYDROCHLORIDE 2 MG/ML
1 SOLUTION OPHTHALMIC DAILY
COMMUNITY
Start: 2025-06-18

## 2025-07-01 RX ORDER — OMEPRAZOLE 40 MG/1
40 CAPSULE, DELAYED RELEASE ORAL DAILY
COMMUNITY

## 2025-07-01 RX ORDER — DILTIAZEM HYDROCHLORIDE 240 MG/1
240 CAPSULE, COATED, EXTENDED RELEASE ORAL DAILY
COMMUNITY

## 2025-07-01 RX ORDER — ISOSORBIDE MONONITRATE 120 MG/1
120 TABLET, EXTENDED RELEASE ORAL DAILY
COMMUNITY

## 2025-07-01 RX ORDER — ATORVASTATIN CALCIUM 80 MG/1
80 TABLET, FILM COATED ORAL NIGHTLY
COMMUNITY

## 2025-07-01 ASSESSMENT — PAIN DESCRIPTION - DESCRIPTORS: DESCRIPTORS: ACHING

## 2025-07-01 ASSESSMENT — PAIN DESCRIPTION - PAIN TYPE: TYPE: CHRONIC PAIN

## 2025-07-01 ASSESSMENT — PAIN DESCRIPTION - ORIENTATION: ORIENTATION: LEFT

## 2025-07-01 ASSESSMENT — PAIN - FUNCTIONAL ASSESSMENT: PAIN_FUNCTIONAL_ASSESSMENT: PREVENTS OR INTERFERES SOME ACTIVE ACTIVITIES AND ADLS

## 2025-07-01 ASSESSMENT — PAIN DESCRIPTION - FREQUENCY: FREQUENCY: CONTINUOUS

## 2025-07-01 ASSESSMENT — PAIN DESCRIPTION - LOCATION: LOCATION: ARM;NECK

## 2025-07-01 ASSESSMENT — PAIN SCALES - GENERAL: PAINLEVEL_OUTOF10: 6

## 2025-07-01 NOTE — PROGRESS NOTES
Under care of team     Endocrinology, [facility] , last seen 6/23/2025    Under care of team     Ortho, Dr. Mg , last seen 3/18/2025    Ventral hernia     Wellness examination     PCP , Shaheed Dailey CNP,  last seen 4/25/2025         Patient was evaluated in PAT & anesthesia guidelines were applied.   NPO guidelines, medication instructions and scheduled arrival time were reviewed with patient.  Advised patient or guardian to please notify surgeon's office if patient or child becomes ill prior to surgery.                                                                                                                        Anesthesia contacted:  no    Medical or cardiac clearance ordered: cardiac clearance on file with most recent office notes in care everywhere from 3/2025.   Most recent stress test and echo in care everywhere from this year as well. EKG today with interval change from previous EKG on file.   Will send today's EKG to cardiologist and request updated cardiac clearance.     IZZY Wise CNP   7/1/25  3:02 PM

## 2025-07-01 NOTE — H&P
History and Physical    Pt Name: Arben Chow  MRN: [id number]  YOB: 1970  Date of evaluation: 7/1/2025  Primary Care Physician: Alda Limon MD    SUBJECTIVE:   History of Chief Complaint:    Arben Chow is a 54 y.o. male who presents for PAT appointment. Patient complains of neck pain with radiculopathy to to his right and left arms, left worse than right. Patient denies any numbness or tingling to his arms but states his left thumb, index and middle fingers are numb. Patient denies any injury or trauma preceding his symptoms but states he recalls typing on a computer in November when his symptoms began. He states he tried physical therapy and injections which helped but his symptoms persist.  Patient has been scheduled for ANTERIOR CERVICAL DISCECTOMY, FUSION C4-5, C5-6 .   Allergies  is allergic to pcn [penicillins], environmental/seasonal, nsaids, and ceclor [cefaclor].  Medications  Prior to Admission medications    Medication Sig Start Date End Date Taking? Authorizing Provider   olopatadine (PATADAY) 0.2 % SOLN ophthalmic solution Place 1 drop into both eyes daily 6/18/25  Yes Jessica Garcia MD   nitroGLYCERIN (NITROSTAT) 0.4 MG SL tablet Place 1 tablet under the tongue every 5 minutes as needed for Chest pain (also takes  for esophageal spasms) up to max of 3 total doses. If no relief after 1 dose, call 911.   Yes Jessica Garcia MD   atorvastatin (LIPITOR) 80 MG tablet Take 1 tablet by mouth at bedtime    Jessica Garcia MD   carvedilol (COREG) 25 MG tablet Take 1 tablet by mouth 2 times daily (with meals)    Jessica Garcia MD   dilTIAZem (CARDIZEM CD) 240 MG extended release capsule Take 1 capsule by mouth daily    Jessica Garcia MD   isosorbide mononitrate (IMDUR) 120 MG extended release tablet Take 1 tablet by mouth daily    Jessica Garcia MD   losartan (COZAAR) 50 MG tablet Take 1 tablet by mouth daily    Jessica Garcia MD   omeprazole

## 2025-07-01 NOTE — H&P (VIEW-ONLY)
History and Physical    Pt Name: Arben Chow  MRN: [id number]  YOB: 1970  Date of evaluation: 7/1/2025  Primary Care Physician: Alda Limon MD    SUBJECTIVE:   History of Chief Complaint:    Arben Chow is a 54 y.o. male who presents for PAT appointment. Patient complains of neck pain with radiculopathy to to his right and left arms, left worse than right. Patient denies any numbness or tingling to his arms but states his left thumb, index and middle fingers are numb. Patient denies any injury or trauma preceding his symptoms but states he recalls typing on a computer in November when his symptoms began. He states he tried physical therapy and injections which helped but his symptoms persist.  Patient has been scheduled for ANTERIOR CERVICAL DISCECTOMY, FUSION C4-5, C5-6 .   Allergies  is allergic to pcn [penicillins], environmental/seasonal, nsaids, and ceclor [cefaclor].  Medications  Prior to Admission medications    Medication Sig Start Date End Date Taking? Authorizing Provider   olopatadine (PATADAY) 0.2 % SOLN ophthalmic solution Place 1 drop into both eyes daily 6/18/25  Yes Jessica Garcia MD   nitroGLYCERIN (NITROSTAT) 0.4 MG SL tablet Place 1 tablet under the tongue every 5 minutes as needed for Chest pain (also takes  for esophageal spasms) up to max of 3 total doses. If no relief after 1 dose, call 911.   Yes Jessica Garcia MD   atorvastatin (LIPITOR) 80 MG tablet Take 1 tablet by mouth at bedtime    Jessica Garcia MD   carvedilol (COREG) 25 MG tablet Take 1 tablet by mouth 2 times daily (with meals)    Jessica Garcia MD   dilTIAZem (CARDIZEM CD) 240 MG extended release capsule Take 1 capsule by mouth daily    Jessica Garcia MD   isosorbide mononitrate (IMDUR) 120 MG extended release tablet Take 1 tablet by mouth daily    Jessica Garcia MD   losartan (COZAAR) 50 MG tablet Take 1 tablet by mouth daily    Jessica Garcia MD   omeprazole

## 2025-07-02 LAB
EKG ATRIAL RATE: 85 BPM
EKG P AXIS: 20 DEGREES
EKG P-R INTERVAL: 192 MS
EKG Q-T INTERVAL: 358 MS
EKG QRS DURATION: 70 MS
EKG QTC CALCULATION (BAZETT): 426 MS
EKG R AXIS: -10 DEGREES
EKG T AXIS: 4 DEGREES
EKG VENTRICULAR RATE: 85 BPM

## 2025-07-28 ENCOUNTER — HOSPITAL ENCOUNTER (INPATIENT)
Age: 55
LOS: 1 days | Discharge: HOME | DRG: 472 | End: 2025-07-29
Attending: ORTHOPAEDIC SURGERY | Admitting: ORTHOPAEDIC SURGERY
Payer: COMMERCIAL

## 2025-07-28 ENCOUNTER — APPOINTMENT (OUTPATIENT)
Dept: GENERAL RADIOLOGY | Age: 55
DRG: 472 | End: 2025-07-28
Attending: ORTHOPAEDIC SURGERY
Payer: COMMERCIAL

## 2025-07-28 ENCOUNTER — ANESTHESIA (OUTPATIENT)
Dept: OPERATING ROOM | Age: 55
DRG: 472 | End: 2025-07-28
Payer: COMMERCIAL

## 2025-07-28 ENCOUNTER — ANESTHESIA EVENT (OUTPATIENT)
Dept: OPERATING ROOM | Age: 55
DRG: 472 | End: 2025-07-28
Payer: COMMERCIAL

## 2025-07-28 DIAGNOSIS — G89.18 POST-OP PAIN: Primary | ICD-10-CM

## 2025-07-28 PROBLEM — K21.9 GASTROESOPHAGEAL REFLUX DISEASE WITHOUT ESOPHAGITIS: Status: ACTIVE | Noted: 2025-07-28

## 2025-07-28 PROBLEM — I47.9 PAROXYSMAL TACHYCARDIA (HCC): Status: ACTIVE | Noted: 2024-01-30

## 2025-07-28 PROBLEM — I87.2 VENOUS INSUFFICIENCY OF LOWER EXTREMITY: Status: ACTIVE | Noted: 2020-12-04

## 2025-07-28 PROBLEM — E11.21 TYPE 2 DIABETES MELLITUS WITH DIABETIC NEPHROPATHY (HCC): Status: ACTIVE | Noted: 2025-07-28

## 2025-07-28 LAB
GLUCOSE BLD-MCNC: 111 MG/DL (ref 74–100)
GLUCOSE BLD-MCNC: 145 MG/DL (ref 75–110)
GLUCOSE BLD-MCNC: 211 MG/DL (ref 75–110)
GLUCOSE BLD-MCNC: 255 MG/DL (ref 75–110)
POTASSIUM BLD-SCNC: 3.4 MMOL/L (ref 3.5–4.5)

## 2025-07-28 PROCEDURE — 6370000000 HC RX 637 (ALT 250 FOR IP)

## 2025-07-28 PROCEDURE — C1729 CATH, DRAINAGE: HCPCS | Performed by: ORTHOPAEDIC SURGERY

## 2025-07-28 PROCEDURE — 6360000002 HC RX W HCPCS

## 2025-07-28 PROCEDURE — 7100000000 HC PACU RECOVERY - FIRST 15 MIN: Performed by: ORTHOPAEDIC SURGERY

## 2025-07-28 PROCEDURE — 3600000014 HC SURGERY LEVEL 4 ADDTL 15MIN: Performed by: ORTHOPAEDIC SURGERY

## 2025-07-28 PROCEDURE — 2580000003 HC RX 258

## 2025-07-28 PROCEDURE — 99223 1ST HOSP IP/OBS HIGH 75: CPT | Performed by: STUDENT IN AN ORGANIZED HEALTH CARE EDUCATION/TRAINING PROGRAM

## 2025-07-28 PROCEDURE — 6360000002 HC RX W HCPCS: Performed by: ORTHOPAEDIC SURGERY

## 2025-07-28 PROCEDURE — 2500000003 HC RX 250 WO HCPCS

## 2025-07-28 PROCEDURE — 2500000003 HC RX 250 WO HCPCS: Performed by: ANESTHESIOLOGY

## 2025-07-28 PROCEDURE — C1821 INTERSPINOUS IMPLANT: HCPCS | Performed by: ORTHOPAEDIC SURGERY

## 2025-07-28 PROCEDURE — 2580000003 HC RX 258: Performed by: ANESTHESIOLOGY

## 2025-07-28 PROCEDURE — 4A11X4G MONITORING OF PERIPHERAL NERVOUS ELECTRICAL ACTIVITY, INTRAOPERATIVE, EXTERNAL APPROACH: ICD-10-PCS | Performed by: ORTHOPAEDIC SURGERY

## 2025-07-28 PROCEDURE — 72020 X-RAY EXAM OF SPINE 1 VIEW: CPT

## 2025-07-28 PROCEDURE — 6370000000 HC RX 637 (ALT 250 FOR IP): Performed by: STUDENT IN AN ORGANIZED HEALTH CARE EDUCATION/TRAINING PROGRAM

## 2025-07-28 PROCEDURE — 7100000001 HC PACU RECOVERY - ADDTL 15 MIN: Performed by: ORTHOPAEDIC SURGERY

## 2025-07-28 PROCEDURE — 6370000000 HC RX 637 (ALT 250 FOR IP): Performed by: ORTHOPAEDIC SURGERY

## 2025-07-28 PROCEDURE — 2709999900 HC NON-CHARGEABLE SUPPLY: Performed by: ORTHOPAEDIC SURGERY

## 2025-07-28 PROCEDURE — C1713 ANCHOR/SCREW BN/BN,TIS/BN: HCPCS | Performed by: ORTHOPAEDIC SURGERY

## 2025-07-28 PROCEDURE — 0RB30ZZ EXCISION OF CERVICAL VERTEBRAL DISC, OPEN APPROACH: ICD-10-PCS | Performed by: ORTHOPAEDIC SURGERY

## 2025-07-28 PROCEDURE — 82947 ASSAY GLUCOSE BLOOD QUANT: CPT

## 2025-07-28 PROCEDURE — 2720000010 HC SURG SUPPLY STERILE: Performed by: ORTHOPAEDIC SURGERY

## 2025-07-28 PROCEDURE — 84132 ASSAY OF SERUM POTASSIUM: CPT

## 2025-07-28 PROCEDURE — 0RG20A0 FUSION OF 2 OR MORE CERVICAL VERTEBRAL JOINTS WITH INTERBODY FUSION DEVICE, ANTERIOR APPROACH, ANTERIOR COLUMN, OPEN APPROACH: ICD-10-PCS | Performed by: ORTHOPAEDIC SURGERY

## 2025-07-28 PROCEDURE — 3600000004 HC SURGERY LEVEL 4 BASE: Performed by: ORTHOPAEDIC SURGERY

## 2025-07-28 PROCEDURE — 2500000003 HC RX 250 WO HCPCS: Performed by: ORTHOPAEDIC SURGERY

## 2025-07-28 PROCEDURE — 72040 X-RAY EXAM NECK SPINE 2-3 VW: CPT

## 2025-07-28 PROCEDURE — 3700000000 HC ANESTHESIA ATTENDED CARE: Performed by: ORTHOPAEDIC SURGERY

## 2025-07-28 PROCEDURE — 3700000001 HC ADD 15 MINUTES (ANESTHESIA): Performed by: ORTHOPAEDIC SURGERY

## 2025-07-28 PROCEDURE — 6360000002 HC RX W HCPCS: Performed by: ANESTHESIOLOGY

## 2025-07-28 PROCEDURE — 1200000000 HC SEMI PRIVATE

## 2025-07-28 DEVICE — COALITION MIS SPACER, 14 X 16, 12&DEG;, 7MM
Type: IMPLANTABLE DEVICE | Site: ANTERIOR CERVICAL | Status: FUNCTIONAL
Brand: COALITION MIS

## 2025-07-28 DEVICE — COALITION MIS SPACER, 14 X 16, 7&DEG;, 7MM
Type: IMPLANTABLE DEVICE | Site: ANTERIOR CERVICAL | Status: FUNCTIONAL
Brand: COALITION MIS

## 2025-07-28 DEVICE — GRAFT BNE SUB SM 1ML CRYOPRESERVED VIABLE CORT CANC BNE: Type: IMPLANTABLE DEVICE | Site: SPINE CERVICAL | Status: FUNCTIONAL

## 2025-07-28 DEVICE — COALITION MIS ANCHOR, 12MM
Type: IMPLANTABLE DEVICE | Site: ANTERIOR CERVICAL | Status: FUNCTIONAL
Brand: COALITION MIS

## 2025-07-28 RX ORDER — DEXAMETHASONE SODIUM PHOSPHATE 4 MG/ML
6 INJECTION, SOLUTION INTRA-ARTICULAR; INTRALESIONAL; INTRAMUSCULAR; INTRAVENOUS; SOFT TISSUE EVERY 24 HOURS
Status: DISCONTINUED | OUTPATIENT
Start: 2025-07-28 | End: 2025-07-28

## 2025-07-28 RX ORDER — ISOSORBIDE MONONITRATE 30 MG/1
120 TABLET, EXTENDED RELEASE ORAL DAILY
Status: DISCONTINUED | OUTPATIENT
Start: 2025-07-29 | End: 2025-07-29 | Stop reason: HOSPADM

## 2025-07-28 RX ORDER — SODIUM CHLORIDE 9 MG/ML
INJECTION, SOLUTION INTRAVENOUS
Status: DISCONTINUED | OUTPATIENT
Start: 2025-07-28 | End: 2025-07-28 | Stop reason: SDUPTHER

## 2025-07-28 RX ORDER — SODIUM CHLORIDE 0.9 % (FLUSH) 0.9 %
5-40 SYRINGE (ML) INJECTION PRN
Status: DISCONTINUED | OUTPATIENT
Start: 2025-07-28 | End: 2025-07-29 | Stop reason: HOSPADM

## 2025-07-28 RX ORDER — DULOXETIN HYDROCHLORIDE 30 MG/1
60 CAPSULE, DELAYED RELEASE ORAL NIGHTLY
Status: DISCONTINUED | OUTPATIENT
Start: 2025-07-28 | End: 2025-07-29 | Stop reason: HOSPADM

## 2025-07-28 RX ORDER — PROPOFOL 10 MG/ML
INJECTION, EMULSION INTRAVENOUS
Status: DISCONTINUED | OUTPATIENT
Start: 2025-07-28 | End: 2025-07-28 | Stop reason: SDUPTHER

## 2025-07-28 RX ORDER — SODIUM CHLORIDE, SODIUM LACTATE, POTASSIUM CHLORIDE, CALCIUM CHLORIDE 600; 310; 30; 20 MG/100ML; MG/100ML; MG/100ML; MG/100ML
INJECTION, SOLUTION INTRAVENOUS CONTINUOUS
Status: DISCONTINUED | OUTPATIENT
Start: 2025-07-28 | End: 2025-07-28 | Stop reason: HOSPADM

## 2025-07-28 RX ORDER — DOCUSATE SODIUM 100 MG/1
100 CAPSULE, LIQUID FILLED ORAL 2 TIMES DAILY PRN
Qty: 20 CAPSULE | Refills: 0 | Status: SHIPPED | OUTPATIENT
Start: 2025-07-28

## 2025-07-28 RX ORDER — ONDANSETRON 2 MG/ML
4 INJECTION INTRAMUSCULAR; INTRAVENOUS
Status: DISCONTINUED | OUTPATIENT
Start: 2025-07-28 | End: 2025-07-28 | Stop reason: HOSPADM

## 2025-07-28 RX ORDER — DEXAMETHASONE SODIUM PHOSPHATE 10 MG/ML
INJECTION, SOLUTION INTRAMUSCULAR; INTRAVENOUS
Status: DISCONTINUED | OUTPATIENT
Start: 2025-07-28 | End: 2025-07-28 | Stop reason: SDUPTHER

## 2025-07-28 RX ORDER — ALBUTEROL SULFATE 90 UG/1
INHALANT RESPIRATORY (INHALATION)
Status: DISCONTINUED | OUTPATIENT
Start: 2025-07-28 | End: 2025-07-28 | Stop reason: SDUPTHER

## 2025-07-28 RX ORDER — SPIRONOLACTONE 25 MG/1
25 TABLET ORAL DAILY
Status: DISCONTINUED | OUTPATIENT
Start: 2025-07-28 | End: 2025-07-29 | Stop reason: HOSPADM

## 2025-07-28 RX ORDER — RANOLAZINE 500 MG/1
500 TABLET, EXTENDED RELEASE ORAL 2 TIMES DAILY
Status: DISCONTINUED | OUTPATIENT
Start: 2025-07-28 | End: 2025-07-29 | Stop reason: HOSPADM

## 2025-07-28 RX ORDER — PANTOPRAZOLE SODIUM 40 MG/1
40 TABLET, DELAYED RELEASE ORAL
Status: DISCONTINUED | OUTPATIENT
Start: 2025-07-29 | End: 2025-07-29 | Stop reason: HOSPADM

## 2025-07-28 RX ORDER — ONDANSETRON 2 MG/ML
INJECTION INTRAMUSCULAR; INTRAVENOUS
Status: DISCONTINUED | OUTPATIENT
Start: 2025-07-28 | End: 2025-07-28 | Stop reason: SDUPTHER

## 2025-07-28 RX ORDER — SODIUM CHLORIDE 9 MG/ML
INJECTION, SOLUTION INTRAVENOUS PRN
Status: DISCONTINUED | OUTPATIENT
Start: 2025-07-28 | End: 2025-07-29 | Stop reason: HOSPADM

## 2025-07-28 RX ORDER — CYCLOBENZAPRINE HCL 10 MG
10 TABLET ORAL EVERY 12 HOURS PRN
Status: DISCONTINUED | OUTPATIENT
Start: 2025-07-28 | End: 2025-07-29 | Stop reason: HOSPADM

## 2025-07-28 RX ORDER — ONDANSETRON 4 MG/1
4 TABLET, ORALLY DISINTEGRATING ORAL EVERY 8 HOURS PRN
Status: DISCONTINUED | OUTPATIENT
Start: 2025-07-28 | End: 2025-07-29 | Stop reason: HOSPADM

## 2025-07-28 RX ORDER — SODIUM CHLORIDE 0.9 % (FLUSH) 0.9 %
5-40 SYRINGE (ML) INJECTION PRN
Status: DISCONTINUED | OUTPATIENT
Start: 2025-07-28 | End: 2025-07-28 | Stop reason: HOSPADM

## 2025-07-28 RX ORDER — MAGNESIUM HYDROXIDE 1200 MG/15ML
LIQUID ORAL CONTINUOUS PRN
Status: DISCONTINUED | OUTPATIENT
Start: 2025-07-28 | End: 2025-07-28 | Stop reason: HOSPADM

## 2025-07-28 RX ORDER — BUMETANIDE 1 MG/1
2 TABLET ORAL DAILY
Status: DISCONTINUED | OUTPATIENT
Start: 2025-07-28 | End: 2025-07-29 | Stop reason: HOSPADM

## 2025-07-28 RX ORDER — SODIUM CHLORIDE 0.9 % (FLUSH) 0.9 %
5-40 SYRINGE (ML) INJECTION EVERY 12 HOURS SCHEDULED
Status: DISCONTINUED | OUTPATIENT
Start: 2025-07-28 | End: 2025-07-28 | Stop reason: HOSPADM

## 2025-07-28 RX ORDER — ACETAMINOPHEN 325 MG/1
650 TABLET ORAL EVERY 6 HOURS
Status: DISCONTINUED | OUTPATIENT
Start: 2025-07-28 | End: 2025-07-29 | Stop reason: HOSPADM

## 2025-07-28 RX ORDER — DEXTROSE MONOHYDRATE 100 MG/ML
INJECTION, SOLUTION INTRAVENOUS CONTINUOUS PRN
Status: DISCONTINUED | OUTPATIENT
Start: 2025-07-28 | End: 2025-07-29 | Stop reason: HOSPADM

## 2025-07-28 RX ORDER — GLUCAGON 1 MG/ML
1 KIT INJECTION PRN
Status: DISCONTINUED | OUTPATIENT
Start: 2025-07-28 | End: 2025-07-29 | Stop reason: HOSPADM

## 2025-07-28 RX ORDER — SODIUM CHLORIDE 0.9 % (FLUSH) 0.9 %
5-40 SYRINGE (ML) INJECTION EVERY 12 HOURS SCHEDULED
Status: DISCONTINUED | OUTPATIENT
Start: 2025-07-28 | End: 2025-07-29 | Stop reason: HOSPADM

## 2025-07-28 RX ORDER — SODIUM CHLORIDE 9 MG/ML
INJECTION, SOLUTION INTRAVENOUS PRN
Status: DISCONTINUED | OUTPATIENT
Start: 2025-07-28 | End: 2025-07-28 | Stop reason: HOSPADM

## 2025-07-28 RX ORDER — NITROGLYCERIN 0.4 MG/1
0.4 TABLET SUBLINGUAL EVERY 5 MIN PRN
Status: DISCONTINUED | OUTPATIENT
Start: 2025-07-28 | End: 2025-07-29 | Stop reason: HOSPADM

## 2025-07-28 RX ORDER — HYDRALAZINE HYDROCHLORIDE 20 MG/ML
10 INJECTION INTRAMUSCULAR; INTRAVENOUS
Status: DISCONTINUED | OUTPATIENT
Start: 2025-07-28 | End: 2025-07-28 | Stop reason: HOSPADM

## 2025-07-28 RX ORDER — BISACODYL 5 MG/1
5 TABLET, DELAYED RELEASE ORAL DAILY PRN
Status: DISCONTINUED | OUTPATIENT
Start: 2025-07-28 | End: 2025-07-29 | Stop reason: HOSPADM

## 2025-07-28 RX ORDER — LIDOCAINE HYDROCHLORIDE 10 MG/ML
INJECTION, SOLUTION EPIDURAL; INFILTRATION; INTRACAUDAL; PERINEURAL
Status: DISCONTINUED | OUTPATIENT
Start: 2025-07-28 | End: 2025-07-28 | Stop reason: SDUPTHER

## 2025-07-28 RX ORDER — ONDANSETRON 2 MG/ML
4 INJECTION INTRAMUSCULAR; INTRAVENOUS EVERY 6 HOURS PRN
Status: DISCONTINUED | OUTPATIENT
Start: 2025-07-28 | End: 2025-07-29 | Stop reason: HOSPADM

## 2025-07-28 RX ORDER — ATORVASTATIN CALCIUM 80 MG/1
80 TABLET, FILM COATED ORAL NIGHTLY
Status: DISCONTINUED | OUTPATIENT
Start: 2025-07-28 | End: 2025-07-29 | Stop reason: HOSPADM

## 2025-07-28 RX ORDER — BUPIVACAINE HYDROCHLORIDE 2.5 MG/ML
INJECTION, SOLUTION INFILTRATION; PERINEURAL PRN
Status: DISCONTINUED | OUTPATIENT
Start: 2025-07-28 | End: 2025-07-28 | Stop reason: HOSPADM

## 2025-07-28 RX ORDER — CETIRIZINE HYDROCHLORIDE 10 MG/1
10 TABLET ORAL NIGHTLY
Status: DISCONTINUED | OUTPATIENT
Start: 2025-07-28 | End: 2025-07-29 | Stop reason: HOSPADM

## 2025-07-28 RX ORDER — DILTIAZEM HYDROCHLORIDE 240 MG/1
240 CAPSULE, COATED, EXTENDED RELEASE ORAL DAILY
Status: DISCONTINUED | OUTPATIENT
Start: 2025-07-29 | End: 2025-07-29 | Stop reason: HOSPADM

## 2025-07-28 RX ORDER — EPINEPHRINE 1 MG/ML
INJECTION, SOLUTION INTRAMUSCULAR; SUBCUTANEOUS
Status: DISCONTINUED | OUTPATIENT
Start: 2025-07-28 | End: 2025-07-28 | Stop reason: SDUPTHER

## 2025-07-28 RX ORDER — FENTANYL CITRATE 50 UG/ML
INJECTION, SOLUTION INTRAMUSCULAR; INTRAVENOUS
Status: DISCONTINUED | OUTPATIENT
Start: 2025-07-28 | End: 2025-07-28 | Stop reason: SDUPTHER

## 2025-07-28 RX ORDER — OXYCODONE AND ACETAMINOPHEN 5; 325 MG/1; MG/1
1 TABLET ORAL EVERY 6 HOURS PRN
Qty: 28 TABLET | Refills: 0 | Status: SHIPPED | OUTPATIENT
Start: 2025-07-28 | End: 2025-08-04

## 2025-07-28 RX ORDER — OXYCODONE HYDROCHLORIDE 5 MG/1
5 TABLET ORAL EVERY 4 HOURS PRN
Status: DISCONTINUED | OUTPATIENT
Start: 2025-07-28 | End: 2025-07-29 | Stop reason: HOSPADM

## 2025-07-28 RX ORDER — GLYCOPYRROLATE 1 MG/5 ML
SYRINGE (ML) INTRAVENOUS
Status: DISCONTINUED | OUTPATIENT
Start: 2025-07-28 | End: 2025-07-28 | Stop reason: SDUPTHER

## 2025-07-28 RX ORDER — SODIUM CHLORIDE, SODIUM LACTATE, POTASSIUM CHLORIDE, CALCIUM CHLORIDE 600; 310; 30; 20 MG/100ML; MG/100ML; MG/100ML; MG/100ML
INJECTION, SOLUTION INTRAVENOUS
Status: DISCONTINUED | OUTPATIENT
Start: 2025-07-28 | End: 2025-07-28 | Stop reason: SDUPTHER

## 2025-07-28 RX ORDER — CARVEDILOL 25 MG/1
25 TABLET ORAL 2 TIMES DAILY WITH MEALS
Status: DISCONTINUED | OUTPATIENT
Start: 2025-07-28 | End: 2025-07-29 | Stop reason: HOSPADM

## 2025-07-28 RX ORDER — LOSARTAN POTASSIUM 50 MG/1
50 TABLET ORAL DAILY
Status: DISCONTINUED | OUTPATIENT
Start: 2025-07-28 | End: 2025-07-29 | Stop reason: HOSPADM

## 2025-07-28 RX ORDER — INSULIN LISPRO 100 [IU]/ML
0-8 INJECTION, SOLUTION INTRAVENOUS; SUBCUTANEOUS
Status: DISCONTINUED | OUTPATIENT
Start: 2025-07-28 | End: 2025-07-29 | Stop reason: HOSPADM

## 2025-07-28 RX ORDER — OXYCODONE HYDROCHLORIDE 10 MG/1
10 TABLET ORAL EVERY 4 HOURS PRN
Status: DISCONTINUED | OUTPATIENT
Start: 2025-07-28 | End: 2025-07-29 | Stop reason: HOSPADM

## 2025-07-28 RX ORDER — CLOPIDOGREL BISULFATE 75 MG/1
75 TABLET ORAL DAILY
Status: DISCONTINUED | OUTPATIENT
Start: 2025-07-28 | End: 2025-07-29 | Stop reason: HOSPADM

## 2025-07-28 RX ORDER — LABETALOL HYDROCHLORIDE 5 MG/ML
10 INJECTION, SOLUTION INTRAVENOUS
Status: DISCONTINUED | OUTPATIENT
Start: 2025-07-28 | End: 2025-07-28 | Stop reason: HOSPADM

## 2025-07-28 RX ORDER — MIDAZOLAM HYDROCHLORIDE 1 MG/ML
INJECTION, SOLUTION INTRAMUSCULAR; INTRAVENOUS
Status: DISCONTINUED | OUTPATIENT
Start: 2025-07-28 | End: 2025-07-28 | Stop reason: SDUPTHER

## 2025-07-28 RX ORDER — DEXAMETHASONE SODIUM PHOSPHATE 4 MG/ML
4 INJECTION, SOLUTION INTRA-ARTICULAR; INTRALESIONAL; INTRAMUSCULAR; INTRAVENOUS; SOFT TISSUE EVERY 6 HOURS
Status: DISCONTINUED | OUTPATIENT
Start: 2025-07-28 | End: 2025-07-29 | Stop reason: HOSPADM

## 2025-07-28 RX ORDER — SODIUM CHLORIDE 9 MG/ML
INJECTION, SOLUTION INTRAVENOUS CONTINUOUS
Status: DISCONTINUED | OUTPATIENT
Start: 2025-07-28 | End: 2025-07-29 | Stop reason: HOSPADM

## 2025-07-28 RX ADMIN — RANOLAZINE 500 MG: 500 TABLET, FILM COATED, EXTENDED RELEASE ORAL at 21:21

## 2025-07-28 RX ADMIN — Medication 0.1 MG: at 08:17

## 2025-07-28 RX ADMIN — CETIRIZINE HYDROCHLORIDE 10 MG: 10 TABLET, FILM COATED ORAL at 21:22

## 2025-07-28 RX ADMIN — PROPOFOL 300 MG: 10 INJECTION, EMULSION INTRAVENOUS at 07:41

## 2025-07-28 RX ADMIN — SODIUM CHLORIDE, PRESERVATIVE FREE 10 ML: 5 INJECTION INTRAVENOUS at 21:21

## 2025-07-28 RX ADMIN — Medication 3000 MG: at 15:23

## 2025-07-28 RX ADMIN — HYDROMORPHONE HYDROCHLORIDE 0.5 MG: 1 INJECTION, SOLUTION INTRAMUSCULAR; INTRAVENOUS; SUBCUTANEOUS at 12:13

## 2025-07-28 RX ADMIN — LOSARTAN POTASSIUM 50 MG: 50 TABLET, FILM COATED ORAL at 17:04

## 2025-07-28 RX ADMIN — SODIUM CHLORIDE: 0.9 INJECTION, SOLUTION INTRAVENOUS at 21:25

## 2025-07-28 RX ADMIN — HYDROMORPHONE HYDROCHLORIDE 0.25 MG: 1 INJECTION, SOLUTION INTRAMUSCULAR; INTRAVENOUS; SUBCUTANEOUS at 11:42

## 2025-07-28 RX ADMIN — DULOXETINE 60 MG: 30 CAPSULE, DELAYED RELEASE ORAL at 21:21

## 2025-07-28 RX ADMIN — ACETAMINOPHEN 650 MG: 325 TABLET ORAL at 18:00

## 2025-07-28 RX ADMIN — SODIUM CHLORIDE: 9 INJECTION, SOLUTION INTRAVENOUS at 07:45

## 2025-07-28 RX ADMIN — OXYCODONE 10 MG: 5 TABLET ORAL at 21:21

## 2025-07-28 RX ADMIN — MIDAZOLAM 2 MG: 1 INJECTION INTRAMUSCULAR; INTRAVENOUS at 07:39

## 2025-07-28 RX ADMIN — INSULIN LISPRO 2 UNITS: 100 INJECTION, SOLUTION INTRAVENOUS; SUBCUTANEOUS at 17:04

## 2025-07-28 RX ADMIN — HYDROMORPHONE HYDROCHLORIDE 0.5 MG: 1 INJECTION, SOLUTION INTRAMUSCULAR; INTRAVENOUS; SUBCUTANEOUS at 11:52

## 2025-07-28 RX ADMIN — HYDROMORPHONE HYDROCHLORIDE 0.25 MG: 1 INJECTION, SOLUTION INTRAMUSCULAR; INTRAVENOUS; SUBCUTANEOUS at 11:37

## 2025-07-28 RX ADMIN — SODIUM CHLORIDE: 0.9 INJECTION, SOLUTION INTRAVENOUS at 21:10

## 2025-07-28 RX ADMIN — EPINEPHRINE 10 MCG: 1 INJECTION INTRAMUSCULAR; INTRAVENOUS; SUBCUTANEOUS at 10:53

## 2025-07-28 RX ADMIN — DEXAMETHASONE SODIUM PHOSPHATE 4 MG: 4 INJECTION, SOLUTION INTRAMUSCULAR; INTRAVENOUS at 21:21

## 2025-07-28 RX ADMIN — REMIFENTANIL HYDROCHLORIDE 0.25 MCG/KG/MIN: 1 INJECTION, POWDER, LYOPHILIZED, FOR SOLUTION INTRAVENOUS at 07:56

## 2025-07-28 RX ADMIN — PROPOFOL 100 MCG/KG/MIN: 10 INJECTION, EMULSION INTRAVENOUS at 07:50

## 2025-07-28 RX ADMIN — FENTANYL CITRATE 50 MCG: 50 INJECTION, SOLUTION INTRAMUSCULAR; INTRAVENOUS at 07:41

## 2025-07-28 RX ADMIN — ALBUTEROL SULFATE 4 PUFF: 90 AEROSOL, METERED RESPIRATORY (INHALATION) at 10:56

## 2025-07-28 RX ADMIN — ACETAMINOPHEN 650 MG: 325 TABLET ORAL at 21:21

## 2025-07-28 RX ADMIN — CYCLOBENZAPRINE 10 MG: 10 TABLET, FILM COATED ORAL at 13:35

## 2025-07-28 RX ADMIN — DEXAMETHASONE SODIUM PHOSPHATE 10 MG: 10 INJECTION, SOLUTION INTRAMUSCULAR; INTRAVENOUS at 07:41

## 2025-07-28 RX ADMIN — Medication 3000 MG: at 07:47

## 2025-07-28 RX ADMIN — FENTANYL CITRATE 50 MCG: 50 INJECTION, SOLUTION INTRAMUSCULAR; INTRAVENOUS at 08:34

## 2025-07-28 RX ADMIN — SODIUM CHLORIDE, POTASSIUM CHLORIDE, SODIUM LACTATE AND CALCIUM CHLORIDE: 600; 310; 30; 20 INJECTION, SOLUTION INTRAVENOUS at 07:32

## 2025-07-28 RX ADMIN — Medication 3000 MG: at 21:48

## 2025-07-28 RX ADMIN — CARVEDILOL 25 MG: 25 TABLET, FILM COATED ORAL at 17:04

## 2025-07-28 RX ADMIN — OXYCODONE 10 MG: 5 TABLET ORAL at 13:35

## 2025-07-28 RX ADMIN — ALBUTEROL SULFATE 6 PUFF: 90 AEROSOL, METERED RESPIRATORY (INHALATION) at 10:49

## 2025-07-28 RX ADMIN — OXYCODONE 10 MG: 5 TABLET ORAL at 17:04

## 2025-07-28 RX ADMIN — ATORVASTATIN CALCIUM 80 MG: 80 TABLET, FILM COATED ORAL at 21:21

## 2025-07-28 RX ADMIN — DEXAMETHASONE SODIUM PHOSPHATE 6 MG: 4 INJECTION, SOLUTION INTRAMUSCULAR; INTRAVENOUS at 15:23

## 2025-07-28 RX ADMIN — SODIUM CHLORIDE, SODIUM LACTATE, POTASSIUM CHLORIDE, AND CALCIUM CHLORIDE: .6; .31; .03; .02 INJECTION, SOLUTION INTRAVENOUS at 06:58

## 2025-07-28 RX ADMIN — LIDOCAINE HYDROCHLORIDE 50 MG: 10 INJECTION, SOLUTION EPIDURAL; INFILTRATION; INTRACAUDAL; PERINEURAL at 07:41

## 2025-07-28 RX ADMIN — ONDANSETRON 4 MG: 2 INJECTION, SOLUTION INTRAMUSCULAR; INTRAVENOUS at 10:27

## 2025-07-28 RX ADMIN — EPINEPHRINE 10 MCG: 1 INJECTION INTRAMUSCULAR; INTRAVENOUS; SUBCUTANEOUS at 10:55

## 2025-07-28 RX ADMIN — EPINEPHRINE 10 MCG: 1 INJECTION INTRAMUSCULAR; INTRAVENOUS; SUBCUTANEOUS at 10:50

## 2025-07-28 RX ADMIN — ALBUTEROL SULFATE 4 PUFF: 90 AEROSOL, METERED RESPIRATORY (INHALATION) at 10:53

## 2025-07-28 RX ADMIN — SPIRONOLACTONE 25 MG: 25 TABLET, FILM COATED ORAL at 17:04

## 2025-07-28 RX ADMIN — INSULIN LISPRO 4 UNITS: 100 INJECTION, SOLUTION INTRAVENOUS; SUBCUTANEOUS at 20:22

## 2025-07-28 ASSESSMENT — PAIN SCALES - GENERAL
PAINLEVEL_OUTOF10: 0
PAINLEVEL_OUTOF10: 6
PAINLEVEL_OUTOF10: 8
PAINLEVEL_OUTOF10: 7
PAINLEVEL_OUTOF10: 3
PAINLEVEL_OUTOF10: 3
PAINLEVEL_OUTOF10: 5
PAINLEVEL_OUTOF10: 8
PAINLEVEL_OUTOF10: 3
PAINLEVEL_OUTOF10: 3
PAINLEVEL_OUTOF10: 8
PAINLEVEL_OUTOF10: 6
PAINLEVEL_OUTOF10: 8
PAINLEVEL_OUTOF10: 6

## 2025-07-28 ASSESSMENT — PAIN DESCRIPTION - ORIENTATION
ORIENTATION: ANTERIOR;LEFT
ORIENTATION: ANTERIOR
ORIENTATION: RIGHT;LEFT
ORIENTATION: ANTERIOR;LEFT
ORIENTATION: ANTERIOR;LEFT
ORIENTATION: ANTERIOR
ORIENTATION: ANTERIOR

## 2025-07-28 ASSESSMENT — PAIN DESCRIPTION - LOCATION
LOCATION: SHOULDER
LOCATION: NECK

## 2025-07-28 ASSESSMENT — PAIN DESCRIPTION - DESCRIPTORS
DESCRIPTORS: ACHING;DISCOMFORT
DESCRIPTORS: DISCOMFORT
DESCRIPTORS: ACHING;DISCOMFORT
DESCRIPTORS: SORE
DESCRIPTORS: ACHING
DESCRIPTORS: ACHING;DISCOMFORT
DESCRIPTORS: ACHING;DISCOMFORT

## 2025-07-28 ASSESSMENT — PAIN DESCRIPTION - FREQUENCY: FREQUENCY: CONTINUOUS

## 2025-07-28 ASSESSMENT — PAIN DESCRIPTION - PAIN TYPE: TYPE: ACUTE PAIN;SURGICAL PAIN

## 2025-07-28 ASSESSMENT — ENCOUNTER SYMPTOMS: SHORTNESS OF BREATH: 1

## 2025-07-28 ASSESSMENT — PAIN - FUNCTIONAL ASSESSMENT: PAIN_FUNCTIONAL_ASSESSMENT: 0-10

## 2025-07-28 NOTE — CONSULTS
[facility]  Office: [phone]  Jose Gutierrez DO, Jose Romo DO, Shashank Marsh DO, Noé Romero DO, Valeriy Frias MD, Diamond Srinivasan MD, Desmond Park MD, Dori Grullon MD,  Vlad Gilbert MD, Samantha Chen MD, Dhaval Bloom DO, Carly Clark MD,  Ronnie Lam DO, Lauren Carpio MD, Tulio Lacey MD, Arben Gutierrez DO, Marissa Robles MD,  Kennedy Pepe DO, Tiffany Penny MD, Isela Montenegro MD, Dayana Campa MD, Gomez Rutherford MD,  Marcel Lucero MD, Pamela Carias MD, Zeny Robles MD, Greg Dubose DO, Antoinette Hough MD,  Ankur Eason MD, Shirley Waterhouse, CNP,  Erin Cash, CNP, Kelly Del Valle, CNP, Gume Sandoval, CNP,  Iman Zavala, DNP, Laverne Antonio, CNP, Lynette Khan, CNP, Ladi Dickerson, CNP, Yudelka Gutiérrez, CNP, Almaz Quintanilla, CNP, Stevie Crump PA-C, Olga Balderrama, CNS, Michelle Camacho, CNP, Nicolette Velásquez, CNP         [facility]   IN-PATIENT SERVICE   [facility]    CONSULTATION / HISTORY AND PHYSICAL EXAMINATION            Date:   7/28/2025  Patient name:  Arben Chow  Date of admission:  7/28/2025  5:52 AM  MRN:   [id number]  Account:  [id number]  YOB: 1970  PCP:    Alda Limon MD  Room:   0234/0234-01  Code Status:    Full Code    Physician Requesting Consult: Eduardo Mg MD    Reason for Consult: Medical management    History Obtained From:     patient, electronic medical record    History of Present Illness:     Arben Chow is a 54 y.o. Non- / non  male who presents with No chief complaint on file.   and is admitted to the hospital for the management of Post-op pain.    54-year-old male with significant medical history including CAD s/p PCI with stent x5, diabetes, hypertension, GERD, hidradenitis suppurativa on doxycycline for chronic suppression, sleep apnea on BiPAP, chronic neck pain with radiculopathy to his right and left home s/p ACDF C4-5, C5-6 by orthopedic

## 2025-07-28 NOTE — PLAN OF CARE
Problem: Chronic Conditions and Co-morbidities  Goal: Patient's chronic conditions and co-morbidity symptoms are monitored and maintained or improved  7/28/2025 1947 by Samia Greenwood, RN  Outcome: Progressing  Flowsheets (Taken 7/28/2025 1947)  Care Plan - Patient's Chronic Conditions and Co-Morbidity Symptoms are Monitored and Maintained or Improved:   Monitor and assess patient's chronic conditions and comorbid symptoms for stability, deterioration, or improvement   Collaborate with multidisciplinary team to address chronic and comorbid conditions and prevent exacerbation or deterioration   Update acute care plan with appropriate goals if chronic or comorbid symptoms are exacerbated and prevent overall improvement and discharge  7/28/2025 1741 by Liya Pham, RN  Outcome: Progressing     Problem: Discharge Planning  Goal: Discharge to home or other facility with appropriate resources  7/28/2025 1947 by Samia Greenwood, RN  Outcome: Progressing  Flowsheets (Taken 7/28/2025 1947)  Discharge to home or other facility with appropriate resources:   Identify barriers to discharge with patient and caregiver   Arrange for needed discharge resources and transportation as appropriate   Identify discharge learning needs (meds, wound care, etc)   Refer to discharge planning if patient needs post-hospital services based on physician order or complex needs related to functional status, cognitive ability or social support system  7/28/2025 1741 by Liya Pham, RN  Outcome: Progressing     Problem: Pain  Goal: Verbalizes/displays adequate comfort level or baseline comfort level  7/28/2025 1947 by Samia Greenwood, RN  Outcome: Progressing  Flowsheets (Taken 7/28/2025 1947)  Verbalizes/displays adequate comfort level or baseline comfort level:   Encourage patient to monitor pain and request assistance   Assess pain using appropriate pain scale   Administer analgesics based on type and severity of pain and evaluate

## 2025-07-28 NOTE — OP NOTE
[address] [address]                            OPERATIVE REPORT      PATIENT NAME: ANDRAE FERREIRA            : 1970  MED REC NO: [id number]                         ROOM: 0234  ACCOUNT NO: [id number]                       ADMIT DATE: 2025  PROVIDER: Eduardo Mg MD      DATE OF PROCEDURE:  2025    SURGEON:  Eduardo Mg MD    ASSISTANT:  Vlad Vyas DO.    PREOPERATIVE DIAGNOSES:    1. Spinal stenosis, C4-5 and C5-6.  2. Cervical myelopathy and radiculopathy.  3. Extreme morbid obesity, BMI 56.    POSTOPERATIVE DIAGNOSES:    1. Spinal stenosis, C4-5 and C5-6.  2. Cervical myelopathy and radiculopathy.  3. Extreme morbid obesity, BMI 56.    PROCEDURES:    1. Anterior cervical diskectomy and fusion, C4-5 and C5-6.  2. [employer] cage stabilization, C4-5 and C5-6.  3. Grafting with Prima[employer] DBM allograft.  4. Neuromonitoring through evoked somatosensory, motor-evoked potential, and running EMG.    INDICATIONS:  The patient is a 54-year-old gentleman, significant pain and tenderness, related to his radiculopathy and weakness in the arm, hands and fingers, along with his early myelopathy findings.  Due to his pain and symptoms, he was counseled on surgical treatment.  The surgical procedure, risks, benefits, and complications were discussed with good comprehension and informed consent obtained.    DESCRIPTION OF PROCEDURE:  The patient was brought to the operating room, placed under appropriate general anesthesia, and transferred to the operating table in the supine position on the operating table.  Towel roll was placed under the neck and arm, shoulder area secured at the side, for a gentle traction due to the position.  His body habitus due to the extreme morbid obesity, also resulted in some difficulty in exposing the neck, and we created as much of an angle along the neck, by

## 2025-07-28 NOTE — BRIEF OP NOTE
Brief Postoperative Note      Patient: Arben Chow  YOB: 1970  MRN: [id number]    Date of Procedure: 7/28/2025    Pre-Op Diagnosis Codes:      * Cervical spinal stenosis [M48.02]     * Herniated nucleus pulposus, C4-5 [M50.221]     * Cervical radiculopathy [M54.12]    Post-Op Diagnosis: Same       Procedure(s):  ANTERIOR CERVICAL DISCECTOMY, FUSION C4-5, C5-6    Surgeon(s):  Eduardo Mg MD    Assistant:  Resident: Vlad Vyas DO    Anesthesia: General    Estimated Blood Loss (mL): 20 cc    Fluids: 1600 cc crystalloid    Complications: None    Specimens:   * No specimens in log *    Implants:  Implant Name Type Inv. Item Serial No.  Lot No. LRB No. Used Action   GRAFT BNE SUB SM 1ML CRYOPRESERVED VIABLE JUAN MIGUEL CANC BNE - [id number]  GRAFT BNE SUB SM 1ML CRYOPRESERVED VIABLE JUAN MIGUEL CANC BNE [id number] [facility]-WD  N/A 1 Implanted   SPACER SPNL 12 MM ANCHR COALITION - [id number]  SPACER SPNL 12 MM ANCHR COALITION  [employer]-WD  N/A 4 Implanted   SPACER SPNL LP 7 DEG [id number] MM LUMBAR COALITION MIS - [id number]  SPACER SPNL LP 7 DEG [id number] MM LUMBAR COALITION MIS  [employer]-WD  N/A 1 Implanted   SPACER SPNL 12 DEG [id number] MM COALITION MIS - [id number]  SPACER SPNL 12 DEG [id number] MM COALITION MIS  [employer]-WD  N/A 1 Implanted         Drains: * No LDAs found *    Findings:  Present At Time Of Surgery (PATOS) (choose all levels that have infection present):  No infection present  Other Findings: degenerative changes to the levels as listed above, see op note.     Electronically signed by Vlad Vyas DO on 7/28/2025 at 11:16 AM

## 2025-07-28 NOTE — ANESTHESIA PRE PROCEDURE
Diagnosis Date    CAD (coronary artery disease) 2019    stents x 5    Chronic pain     lower back and legs , also left arm    Depression     Diabetes mellitus (HCC)     Wears Dexcom 7    Edema     Esophageal stricture     and spasms    GERD (gastroesophageal reflux disease)     Hidradenitis suppurativa     Hyperlipidemia     Hypertension     Kidney calculi 2005    Mobility impaired     able to walk short distances , gets SOB on exertion and has chronic low back and leg pain    Neuropathy     very little in feet    Sleep apnea     Bipap, uses religiously    SOB (shortness of breath) on exertion     Torn ligament     left arm    Under care of team     Cardiology , [facility], last seen 3/18/2025    Under care of team     Endocrinology, [facility] , last seen 6/23/2025    Under care of team     Ortho, Dr. Mg , last seen 3/18/2025    Ventral hernia     Wellness examination     PCP , Stephanie Shafer [facility],  last seen 4/25/2025       Past Surgical History:        Procedure Laterality Date    CORONARY ANGIOPLASTY WITH STENT PLACEMENT  09/17/2019    RIAZ to diag    CORONARY ANGIOPLASTY WITH STENT PLACEMENT  01/10/2020    RIAZ to OM2    CORONARY ANGIOPLASTY WITH STENT PLACEMENT  04/29/2019    RIAZ to Cx and Diag    CORONARY ANGIOPLASTY WITH STENT PLACEMENT  09/29/2022    RIAZ to LAD    ESOPHAGOGASTRODUODENOSCOPY  07/15/2020    Dr. Lilly at [facility]    KNEE ARTHROSCOPY Right 2013    KNEE ARTHROSCOPY Left 2002    NERVE SURGERY Left 1987    pinky finger    SPINAL FUSION  2010    L5 S1    TONSILLECTOMY  2011    attempted , nasal septum repair    TUMOR REMOVAL Left 1977    leg       Social History:    Social History     Tobacco Use    Smoking status: Never    Smokeless tobacco: Never   Substance Use Topics    Alcohol use: Yes     Comment: rare                                Counseling given: Not Answered      Vital Signs (Current):   Vitals:    07/28/25 0628 07/28/25 0636   BP:  139/69   Pulse:  94   Resp:

## 2025-07-28 NOTE — DISCHARGE INSTRUCTIONS
Orthopaedic Instructions:  -Weight bearing status: Activities as tolerated, okay to take off c-collar while seated and for hygiene purposes.  -Do not remove dressings until your post-operative follow up visit.  -Always look for signs of compartment syndrome: pain out of proportion to the injury, pain not controlled with pain medication, numbness in digits, changing of color of digits (paleness). If these signs occur return to ED immediately for reassessment.  -Ice (20 minutes on and off 1 hour) and elevate to reduce swelling and throbbing pain.  -Should urinate within 8 hours of surgery.  -Call the office or come to Emergency Room if signs of infection appear (hot, swollen, red, draining pus, fever)  -Take medications as prescribed.  -Wean off narcotics (percocet/norco) as soon as possible. Do not take tylenol if still taking narcotics.  -Follow up with Dr. Mg in his office 14 days after surgery. Call [phone]  to schedule/confirm or with any questions/concerns.

## 2025-07-28 NOTE — INTERVAL H&P NOTE
Pt Name: Arben Chow  MRN: [id number]  YOB: 1970  Date of evaluation: 7/28/2025    I have reviewed the patient's history and physical examination completed in pre-admission testing on 7/1/2025.    No changes to history or on examination today, unless noted below.   None.     IZZY Wise - CNP  7/28/25  6:40 AM

## 2025-07-29 VITALS
TEMPERATURE: 98.4 F | SYSTOLIC BLOOD PRESSURE: 123 MMHG | BODY MASS INDEX: 47.74 KG/M2 | OXYGEN SATURATION: 93 % | DIASTOLIC BLOOD PRESSURE: 58 MMHG | RESPIRATION RATE: 17 BRPM | HEART RATE: 107 BPM | WEIGHT: 315 LBS | HEIGHT: 68 IN

## 2025-07-29 LAB
ANION GAP SERPL CALCULATED.3IONS-SCNC: 13 MMOL/L (ref 9–16)
BUN SERPL-MCNC: 13 MG/DL (ref 6–20)
CALCIUM SERPL-MCNC: 8.8 MG/DL (ref 8.6–10.4)
CHLORIDE SERPL-SCNC: 102 MMOL/L (ref 98–107)
CO2 SERPL-SCNC: 21 MMOL/L (ref 20–31)
CREAT SERPL-MCNC: 0.8 MG/DL (ref 0.7–1.2)
ERYTHROCYTE [DISTWIDTH] IN BLOOD BY AUTOMATED COUNT: 14.1 % (ref 11.8–14.4)
EST. AVERAGE GLUCOSE BLD GHB EST-MCNC: 183 MG/DL
GFR, ESTIMATED: >90 ML/MIN/1.73M2
GLUCOSE BLD-MCNC: 227 MG/DL (ref 75–110)
GLUCOSE BLD-MCNC: 374 MG/DL (ref 75–110)
GLUCOSE SERPL-MCNC: 226 MG/DL (ref 74–99)
HBA1C MFR BLD: 8 % (ref 4–6)
HCT VFR BLD AUTO: 40.7 % (ref 40.7–50.3)
HGB BLD-MCNC: 13.4 G/DL (ref 13–17)
MCH RBC QN AUTO: 31 PG (ref 25.2–33.5)
MCHC RBC AUTO-ENTMCNC: 32.9 G/DL (ref 28.4–34.8)
MCV RBC AUTO: 94.2 FL (ref 82.6–102.9)
NRBC BLD-RTO: 0 PER 100 WBC
PLATELET # BLD AUTO: 208 K/UL (ref 138–453)
PMV BLD AUTO: 10 FL (ref 8.1–13.5)
POTASSIUM SERPL-SCNC: 4.2 MMOL/L (ref 3.7–5.3)
RBC # BLD AUTO: 4.32 M/UL (ref 4.21–5.77)
SODIUM SERPL-SCNC: 136 MMOL/L (ref 136–145)
WBC OTHER # BLD: 10.7 K/UL (ref 3.5–11.3)

## 2025-07-29 PROCEDURE — 6360000002 HC RX W HCPCS

## 2025-07-29 PROCEDURE — 97535 SELF CARE MNGMENT TRAINING: CPT

## 2025-07-29 PROCEDURE — 97116 GAIT TRAINING THERAPY: CPT

## 2025-07-29 PROCEDURE — 97162 PT EVAL MOD COMPLEX 30 MIN: CPT

## 2025-07-29 PROCEDURE — 6370000000 HC RX 637 (ALT 250 FOR IP): Performed by: STUDENT IN AN ORGANIZED HEALTH CARE EDUCATION/TRAINING PROGRAM

## 2025-07-29 PROCEDURE — 85027 COMPLETE CBC AUTOMATED: CPT

## 2025-07-29 PROCEDURE — 97166 OT EVAL MOD COMPLEX 45 MIN: CPT

## 2025-07-29 PROCEDURE — 2580000003 HC RX 258

## 2025-07-29 PROCEDURE — 6370000000 HC RX 637 (ALT 250 FOR IP)

## 2025-07-29 PROCEDURE — 82947 ASSAY GLUCOSE BLOOD QUANT: CPT

## 2025-07-29 PROCEDURE — 83036 HEMOGLOBIN GLYCOSYLATED A1C: CPT

## 2025-07-29 PROCEDURE — 36415 COLL VENOUS BLD VENIPUNCTURE: CPT

## 2025-07-29 PROCEDURE — 2500000003 HC RX 250 WO HCPCS

## 2025-07-29 PROCEDURE — 80048 BASIC METABOLIC PNL TOTAL CA: CPT

## 2025-07-29 RX ADMIN — INSULIN LISPRO 2 UNITS: 100 INJECTION, SOLUTION INTRAVENOUS; SUBCUTANEOUS at 07:55

## 2025-07-29 RX ADMIN — OXYCODONE 10 MG: 5 TABLET ORAL at 06:32

## 2025-07-29 RX ADMIN — OXYCODONE 10 MG: 5 TABLET ORAL at 10:23

## 2025-07-29 RX ADMIN — CARVEDILOL 25 MG: 25 TABLET, FILM COATED ORAL at 07:54

## 2025-07-29 RX ADMIN — INSULIN LISPRO 8 UNITS: 100 INJECTION, SOLUTION INTRAVENOUS; SUBCUTANEOUS at 12:22

## 2025-07-29 RX ADMIN — ISOSORBIDE MONONITRATE 120 MG: 30 TABLET, EXTENDED RELEASE ORAL at 07:54

## 2025-07-29 RX ADMIN — ACETAMINOPHEN 650 MG: 325 TABLET ORAL at 02:21

## 2025-07-29 RX ADMIN — SODIUM CHLORIDE, PRESERVATIVE FREE 10 ML: 5 INJECTION INTRAVENOUS at 07:57

## 2025-07-29 RX ADMIN — OXYCODONE 10 MG: 5 TABLET ORAL at 02:21

## 2025-07-29 RX ADMIN — DEXAMETHASONE SODIUM PHOSPHATE 4 MG: 4 INJECTION, SOLUTION INTRAMUSCULAR; INTRAVENOUS at 02:21

## 2025-07-29 RX ADMIN — EMPAGLIFLOZIN 25 MG: 10 TABLET, FILM COATED ORAL at 07:54

## 2025-07-29 RX ADMIN — RANOLAZINE 500 MG: 500 TABLET, FILM COATED, EXTENDED RELEASE ORAL at 07:55

## 2025-07-29 RX ADMIN — BUMETANIDE 2 MG: 1 TABLET ORAL at 07:54

## 2025-07-29 RX ADMIN — DEXAMETHASONE SODIUM PHOSPHATE 4 MG: 4 INJECTION, SOLUTION INTRAMUSCULAR; INTRAVENOUS at 07:55

## 2025-07-29 RX ADMIN — ACETAMINOPHEN 650 MG: 325 TABLET ORAL at 07:54

## 2025-07-29 RX ADMIN — SODIUM CHLORIDE: 0.9 INJECTION, SOLUTION INTRAVENOUS at 04:41

## 2025-07-29 RX ADMIN — PANTOPRAZOLE SODIUM 40 MG: 40 TABLET, DELAYED RELEASE ORAL at 06:31

## 2025-07-29 RX ADMIN — DILTIAZEM HYDROCHLORIDE 240 MG: 240 CAPSULE, EXTENDED RELEASE ORAL at 07:54

## 2025-07-29 ASSESSMENT — PAIN SCALES - GENERAL
PAINLEVEL_OUTOF10: 6
PAINLEVEL_OUTOF10: 0
PAINLEVEL_OUTOF10: 5
PAINLEVEL_OUTOF10: 5
PAINLEVEL_OUTOF10: 7
PAINLEVEL_OUTOF10: 0
PAINLEVEL_OUTOF10: 7
PAINLEVEL_OUTOF10: 0

## 2025-07-29 ASSESSMENT — PAIN DESCRIPTION - LOCATION
LOCATION: NECK

## 2025-07-29 ASSESSMENT — PAIN DESCRIPTION - DESCRIPTORS
DESCRIPTORS: ACHING;SHARP
DESCRIPTORS: ACHING
DESCRIPTORS: ACHING

## 2025-07-29 ASSESSMENT — PAIN DESCRIPTION - ORIENTATION
ORIENTATION: MID
ORIENTATION: MID
ORIENTATION: ANTERIOR;LEFT

## 2025-07-29 NOTE — PROGRESS NOTES
Patient discharged to home via wheelchair x1 staff with all belongings, including his glasses, cell phone, and .

## 2025-07-29 NOTE — PROGRESS NOTES
I could not evaluate this patient because the patient was discharged before I could round on the patient    Electronically signed by Samantha Chen MD on 7/29/2025 at 1:47 PM

## 2025-07-29 NOTE — PROGRESS NOTES
CLINICAL PHARMACY NOTE: MEDS TO BEDS    Total # of Prescriptions Filled: 2   The following medications were delivered to the patient:  Dok  percocet    Additional Documentation:

## 2025-07-29 NOTE — PROGRESS NOTES
Discharge instructions reviewed with patient and spouse. Patient waiting on meds to beds and transportation home, which is on the way.

## 2025-07-29 NOTE — PROGRESS NOTES
Occupational Therapy  Occupational Therapy Initial Evaluation  Facility/Department: [address] ORTHO/MED SURG   Patient Name: Arben Chow        MRN: [id number]    : 1970    Date of Service: 2025    Past Medical History:  has a past medical history of CAD (coronary artery disease), Chronic pain, Depression, Diabetes mellitus (HCC), Edema, Esophageal stricture, GERD (gastroesophageal reflux disease), Hidradenitis suppurativa, Hyperlipidemia, Hypertension, Kidney calculi, Mobility impaired, Neuropathy, Sleep apnea, SOB (shortness of breath) on exertion, Torn ligament, Under care of team, Under care of team, Under care of team, Ventral hernia, and Wellness examination.  Past Surgical History:  has a past surgical history that includes Tonsillectomy (); Knee arthroscopy (Right, ); Spinal fusion (); Knee arthroscopy (Left, ); Nerve Surgery (Left, ); tumor removal (Left, ); Coronary angioplasty with stent (2019); Coronary angioplasty with stent (01/10/2020); Coronary angioplasty with stent (2019); Coronary angioplasty with stent (2022); Esophagogastroduodenoscopy (07/15/2020); Anterior cervical discectomy (2025); and cervical fusion (N/A, 2025).    Discharge Recommendations  Discharge Recommendations: Patient would benefit from continued therapy after discharge    Assessment  Performance deficits / Impairments: Decreased functional mobility ;Decreased ADL status;Decreased safe awareness;Decreased cognition;Decreased endurance;Decreased balance;Decreased high-level IADLs  Assessment: Pt presents s/p ACDF C4-6, with c-collar in place, with above noted deficits impacting pt's ADL status. Pt to benefit from continued therapy services while hospitalized and at discharge to maximize pt's safety and independence in performing functional tasks. Pt expected to be safe to return to prior living arrangements at discharge with supportive family able to assist at all

## 2025-07-29 NOTE — PROGRESS NOTES
Patient asking if he is supposed to wear a collar to support his neck. Message sent to Ortho resident, Dr. Reynaga, requesting clarification as there is no order for a collar.    Order for a collar received and implemented.

## 2025-07-29 NOTE — PROGRESS NOTES
Orthopedic Progress Note    Patient:  Arben Chow  YOB: 1970     54 y.o. male    Subjective:  Patient seen and examined this morning. No complaints or concerns. No issues overnight per nursing. Pain is well controlled on current regimen. Denies fever, HA, CP, SOB, N/V, dysuria, new numbness/tingling.  Patient did not work with physical therapy, he will work with them today.    Vitals reviewed, afebrile    Objective:   Vitals:    07/29/25 0415   BP: 119/68   Pulse: 100   Resp: 17   Temp: 99 °F (37.2 °C)   SpO2: 95%     Gen: NAD, cooperative    Cardiovascular: Regular rate   Respiratory: No acute respiratory distress, breathing comfortably on CPAP    Orthopedic Exam    BUE: 5/5 strength to wrist flexion/extension, elbow flexion/extension, shoulder abduction/adduction.  Sensation intact to light touch from C4-T1.  No saturation to the anterior neck dressing.  No hematoma noted on palpation.  No ecchymosis noted.  Radial pulses +2 BCR.    No results for input(s): \"WBC\", \"HGB\", \"HCT\", \"PLT\", \"INR\", \"NA\", \"K\", \"BUN\", \"CREATININE\", \"GLUCOSE\" in the last 72 hours.    Invalid input(s): \"PT\", \"PTT\"   Meds:   Abx: Postop Ancef  DVT ppx: EPC  See rec for complete list    Impression 54 y.o. male who is being seen for:    - Cervical myelopathy    DOS: 7/28  - ACDF C4-6    Plan  - No further plans for orthopedic intervention at this time  - Post-op Ancef q8h x2 doses  - Soft dressings on neck. Okay to reinforce/maintain by nursing if necessary. Please notify Ortho if saturated.  - Activities as tolerated  - Pain control and medical management per orthopedics  - Multimodal pain control ordered   - DVT ppx: Anticoagulation to be held till postoperative day 4  - Ice/Elevate to control pain and edema  - Diet: Adult diet  - Encourage Incentive Spirometry use  - PT/OT will evaluate today   - Okay to discharge home from orthopedic perspective pending evaluation by PT   - F/u with Dr. Mg in his clinic

## 2025-07-29 NOTE — PROGRESS NOTES
Physical Therapy  Facility/Department: [address] ORTHO/MED SURG   Physical Therapy Initial Evaluation    Patient Name: Arben Chow        MRN: [id number]    : 1970    Date of Service: 2025    No chief complaint on file.    Past Medical History:  has a past medical history of CAD (coronary artery disease), Chronic pain, Depression, Diabetes mellitus (HCC), Edema, Esophageal stricture, GERD (gastroesophageal reflux disease), Hidradenitis suppurativa, Hyperlipidemia, Hypertension, Kidney calculi, Mobility impaired, Neuropathy, Sleep apnea, SOB (shortness of breath) on exertion, Torn ligament, Under care of team, Under care of team, Under care of team, Ventral hernia, and Wellness examination.  Past Surgical History:  has a past surgical history that includes Tonsillectomy (); Knee arthroscopy (Right, ); Spinal fusion (); Knee arthroscopy (Left, ); Nerve Surgery (Left, ); tumor removal (Left, ); Coronary angioplasty with stent (2019); Coronary angioplasty with stent (01/10/2020); Coronary angioplasty with stent (2019); Coronary angioplasty with stent (2022); Esophagogastroduodenoscopy (07/15/2020); and Anterior cervical discectomy (2025).    Discharge Recommendations     PT Equipment Recommendations  Equipment Needed: No  Other: Pt has walker at home , recommend trial with cane pt able to provide    Assessment  Body Structures, Functions, Activity Limitations Requiring Skilled Therapeutic Intervention: Decreased endurance, Increased pain  Assessment: Pt presents with post surgical pain demonstrating independence donning/doffing  cervical collar, mod I with bed mobilty, sit <> stand and transfers Mod I with externa support. Pt ambulated ~ 100 ft x 2 with rest breaks needed due to increased LB pain with prolonged ambulating. Pt ascended/descended 1 step x 3 with left railing. SBA. Pt educated on spine precautions including log roll, safety with mobilty

## 2025-07-30 NOTE — DISCHARGE SUMMARY
Orthopedic  Discharge Summary         Patient Identification:  Arben Chow is a 54 y.o. male.  :  1970  MRN: [id number]     Acct: [id number]   Admit Date:  2025  Discharge date and time: 2025 12:41 PM     Attending Provider: No att. providers found                                     Reason for Admission: Post-operative pain    Discharge Diagnoses:   Patient Active Problem List   Diagnosis    Chest pain    Cervical radiculopathy    Cervical spondylosis    Post-op pain    Body mass index (BMI) 50.0-59.9, adult ([facility])    Essential hypertension, benign    Dyslipidemia    Gastroesophageal reflux disease without esophagitis    Mixed anxiety depressive disorder    Obstructive sleep apnea syndrome    Paroxysmal tachycardia ([facility])    Type 2 diabetes mellitus with diabetic nephropathy ([facility])    Venous insufficiency of lower extremity        Consults:  Internal medicine    Procedures:     2025    1. Anterior cervical diskectomy and fusion, C4-5 and C5-6.  2. [employer] cage stabilization, C4-5 and C5-6.  3. Grafting with [employer]ima[employer] DBM allograft.  4. Neuromonitoring through evoked somatosensory, motor-evoked potential, and running EMG.    Hospital Course:   Arben Chow is a 54 y.o. male following ACDF c4-5, c5-6 .  We discussed DC with patient and he is ok with DC.  All questions and concerns were addressed at this time.    Laboratory parameters were followed and optimized when possible.    Time spend on discharge discussion and plannin minutes    Disposition:   Home     Discharged Condition:  Stable     Discharge Medications:         Medication List        START taking these medications      docusate sodium 100 MG capsule  Commonly known as: COLACE  Take 1 capsule by mouth 2 times daily as needed for Constipation     oxyCODONE-acetaminophen 5-325 MG per tablet  Commonly known as: Percocet  Take 1 tablet by mouth every 6 hours as needed for Pain for up to 7 days. Intended supply: 5

## 2025-07-31 NOTE — ANESTHESIA POSTPROCEDURE EVALUATION
Department of Anesthesiology  Postprocedure Note    Patient: Arben Chow  MRN: [id number]  YOB: 1970  Date of evaluation: 7/30/2025    Procedure Summary       Date: 07/28/25 Room / Location: [address]    Anesthesia Start: 0732 Anesthesia Stop: 1122    Procedure: ANTERIOR CERVICAL DISCECTOMY, FUSION C4-5, C5-6 Diagnosis:       Cervical spinal stenosis      Herniated nucleus pulposus, C4-5      Cervical radiculopathy      (Cervical spinal stenosis [M48.02])      (Herniated nucleus pulposus, C4-5 [M50.221])      (Cervical radiculopathy [M54.12])    Surgeons: Eduardo Mg MD Responsible Provider: Bayron Resendiz MD    Anesthesia Type: general ASA Status: 3            Anesthesia Type: No value filed.    Sierra Phase I: Sierra Score: 9    Sierra Phase II:      Anesthesia Post Evaluation    Patient location during evaluation: PACU  Patient participation: complete - patient participated  Level of consciousness: awake  Airway patency: patent  Nausea & Vomiting: no nausea and no vomiting  Cardiovascular status: blood pressure returned to baseline  Respiratory status: acceptable  Hydration status: euvolemic  Comments: BP (!) 123/58   Pulse (!) 107   Temp 98.4 °F (36.9 °C) (Oral)   Resp 17   Ht 1.727 m (5' 8\")   Wt (!) 167.8 kg (369 lb 14.9 oz)   SpO2 93%   BMI 56.25 kg/m²   Pain Assessment: Pain Level: 5        No notable events documented.

## (undated) DEVICE — NEPTUNE E-SEP 165MM SUCTION SLEEVE: Brand: NEPTUNE E-SEP

## (undated) DEVICE — GOWN,AURORA,NONREINFORCED,LARGE: Brand: MEDLINE

## (undated) DEVICE — SHEET,DRAPE,70X100,STERILE: Brand: MEDLINE

## (undated) DEVICE — NEEDLE, QUINCKE, 18GX3.5": Brand: MEDLINE

## (undated) DEVICE — DRESSING TRNSPAR W5XL4.5IN FLM SHT SEMIPERMEABLE WIND

## (undated) DEVICE — TOWEL,OR,DSP,ST,NATURAL,DLX,4/PK,20PK/CS: Brand: MEDLINE

## (undated) DEVICE — STRIP,CLOSURE,WOUND,MEDI-STRIP,1/2X4: Brand: MEDLINE

## (undated) DEVICE — PAD,NON-ADHERENT,3X8,STERILE,LF,1/PK: Brand: CURAD

## (undated) DEVICE — BLADE ES L6IN ELASTOMERIC COAT INSUL DURABLE BEND UPTO

## (undated) DEVICE — STRAP,POSITIONING,KNEE/BODY,FOAM,4X60": Brand: MEDLINE

## (undated) DEVICE — 1.5MM X 10.0MM STRAIGHT ROUTER

## (undated) DEVICE — DRAPE C ARM W/ POLY STRP W42XL72IN FOR MOB XR

## (undated) DEVICE — RESERVOIR,SUCTION,100CC,SILICONE: Brand: MEDLINE

## (undated) DEVICE — SUTURE VICRYL SZ 2-0 L27IN ABSRB UD L26MM SH 1/2 CIR J417H

## (undated) DEVICE — MASTISOL ADHESIVE LIQ 2/3ML

## (undated) DEVICE — AGENT HEMOSTATIC SURGIFLOW MATRIX KIT W/THROMBIN

## (undated) DEVICE — Device

## (undated) DEVICE — BUR SURG OD30MM DMND RND EXTRA COARSE TAPR N FLUT ST FOR

## (undated) DEVICE — DRESSING,GAUZE,OIL EMLSN,CURAD,3X8",1/PK: Brand: CURAD

## (undated) DEVICE — STRAP ARMBRD W1.5XL32IN FOAM STR YET SFT W/ HK AND LOOP

## (undated) DEVICE — GLOVE SURG SZ 8 L11.77IN FNGR THK9.8MIL STRW LTX POLYMER

## (undated) DEVICE — THE STERILE LIGHT HANDLE COVER IS USED WITH STERIS SURGICAL LIGHTING AND VISUALIZATION SYSTEMS.

## (undated) DEVICE — CONTAINER,SPECIMEN,4OZ,OR STRL: Brand: MEDLINE

## (undated) DEVICE — SYRINGE MED 10ML LUERLOCK TIP W/O SFTY DISP

## (undated) DEVICE — BLADE ES L4IN INSUL EDGE

## (undated) DEVICE — SURGICAL SUCTION CONNECTING TUBE WITH MALE CONNECTOR AND SUCTION CLAMP, 2 FT. LONG (.6 M), 5 MM I.D.: Brand: CONMED

## (undated) DEVICE — GLOVE SURG SZ 8 CRM LTX FREE POLYISOPRENE POLYMER BEAD ANTI

## (undated) DEVICE — BLADE ES ELASTOMERIC COAT INSUL DURABLE BEND UPTO 90DEG

## (undated) DEVICE — BLADE,CARBON-STEEL,15,STRL,DISPOSABLE,TB: Brand: MEDLINE

## (undated) DEVICE — ELECTRODE PT RET AD L9FT HI MOIST COND ADH HYDRGEL CORDED

## (undated) DEVICE — COVER,TABLE,HEAVY DUTY,60"X90",STRL: Brand: MEDLINE

## (undated) DEVICE — GARMENT,MEDLINE,DVT,INT,CALF,MED, GEN2: Brand: MEDLINE

## (undated) DEVICE — GOWN,SIRUS,NONRNF,SETINSLV,XL,20/CS: Brand: MEDLINE

## (undated) DEVICE — DRAIN SURG L49IN DIA1/8IN 10IN H SIL W/O TRCR END PERF

## (undated) DEVICE — DRAPE,THYROID,SOFT,STERILE: Brand: MEDLINE

## (undated) DEVICE — ABS MED DISTRACTION PIN, 14MM PATIENT (INNER): Brand: ABS MED DISTRACTION PIN

## (undated) DEVICE — DRESSING TRNSPAR W4XL4.8IN W/ N ADH PD SURESITE-123 PLUSPD

## (undated) DEVICE — WAX SURG 2.5GM HEMSTAT BNE BEESWAX PARAFFIN ISO PALMITATE